# Patient Record
Sex: FEMALE | Race: WHITE | Employment: OTHER | RURAL
[De-identification: names, ages, dates, MRNs, and addresses within clinical notes are randomized per-mention and may not be internally consistent; named-entity substitution may affect disease eponyms.]

---

## 2023-05-30 RX ORDER — LEVOTHYROXINE SODIUM 0.07 MG/1
TABLET ORAL
Qty: 30 TABLET | Refills: 2 | Status: SHIPPED | OUTPATIENT
Start: 2023-05-30

## 2023-06-01 ENCOUNTER — HOSPITAL ENCOUNTER (OUTPATIENT)
Dept: MAMMOGRAPHY | Facility: HOSPITAL | Age: 63
Discharge: HOME | End: 2023-06-01
Payer: MEDICARE

## 2023-06-01 VITALS — WEIGHT: 198 LBS | BODY MASS INDEX: 32.99 KG/M2 | HEIGHT: 65 IN

## 2023-06-01 DIAGNOSIS — Z12.31 BREAST CANCER SCREENING BY MAMMOGRAM: ICD-10-CM

## 2023-06-01 PROCEDURE — 77063 BREAST TOMOSYNTHESIS BI: CPT

## 2023-06-06 ENCOUNTER — HOSPITAL ENCOUNTER (OUTPATIENT)
Facility: HOSPITAL | Age: 63
Discharge: HOME | End: 2023-06-06
Payer: MEDICARE

## 2023-06-06 DIAGNOSIS — E03.9 ACQUIRED HYPOTHYROIDISM: ICD-10-CM

## 2023-06-06 DIAGNOSIS — E03.9 ACQUIRED HYPOTHYROIDISM: Primary | ICD-10-CM

## 2023-06-06 LAB — TSH SERPL-MCNC: 3.91 UIU/ML (ref 0.27–4.2)

## 2023-06-06 PROCEDURE — 84443 ASSAY THYROID STIM HORMONE: CPT

## 2023-06-06 PROCEDURE — 36415 COLL VENOUS BLD VENIPUNCTURE: CPT

## 2023-08-28 RX ORDER — LEVOTHYROXINE SODIUM 0.07 MG/1
TABLET ORAL
Qty: 90 TABLET | Refills: 0 | Status: SHIPPED | OUTPATIENT
Start: 2023-08-28

## 2023-10-16 ENCOUNTER — TELEPHONE (OUTPATIENT)
Dept: CARDIOLOGY | Facility: CLINIC | Age: 63
End: 2023-10-16

## 2023-10-16 NOTE — TELEPHONE ENCOUNTER
Caller: Darcie NIETO    Relationship to patient: Self    Best call back number: [phone]    Chief complaint:     Type of visit: FOLLOW UP    Requested date: ASAP     If rescheduling, when is the original appointment: 10-17-23     Additional notes:PATIENT NEEDS FOLLOW UP APPOINTMENT. PLEASE REACH OUT TO PATIENT TO SCHEDULE.

## 2023-11-30 RX ORDER — LEVOTHYROXINE SODIUM 0.07 MG/1
TABLET ORAL
Qty: 90 TABLET | Refills: 0 | Status: SHIPPED | OUTPATIENT
Start: 2023-11-30

## 2023-11-30 NOTE — TELEPHONE ENCOUNTER
Patient called, requested refill.        Next Office Visit Date:  Future Appointments   Date Time Provider [address]   5/8/2024  1:30 PM NEYDA Hankins please review via PDMP

## 2024-01-10 ENCOUNTER — OFFICE VISIT (OUTPATIENT)
Dept: FAMILY MEDICINE CLINIC | Age: 64
End: 2024-01-10
Payer: MEDICARE

## 2024-01-10 ENCOUNTER — HOSPITAL ENCOUNTER (OUTPATIENT)
Facility: HOSPITAL | Age: 64
Discharge: HOME | End: 2024-01-10
Payer: MEDICARE

## 2024-01-10 VITALS
TEMPERATURE: 97.3 F | BODY MASS INDEX: 32.26 KG/M2 | SYSTOLIC BLOOD PRESSURE: 122 MMHG | DIASTOLIC BLOOD PRESSURE: 76 MMHG | HEIGHT: 65 IN | HEART RATE: 70 BPM | RESPIRATION RATE: 16 BRPM | OXYGEN SATURATION: 97 % | WEIGHT: 193.6 LBS

## 2024-01-10 DIAGNOSIS — M25.50 POLYARTHRALGIA: ICD-10-CM

## 2024-01-10 DIAGNOSIS — G47.33 OSA (OBSTRUCTIVE SLEEP APNEA): ICD-10-CM

## 2024-01-10 DIAGNOSIS — E03.9 ACQUIRED HYPOTHYROIDISM: Primary | ICD-10-CM

## 2024-01-10 DIAGNOSIS — L30.4 INTERTRIGO: ICD-10-CM

## 2024-01-10 DIAGNOSIS — E78.5 HYPERLIPIDEMIA, UNSPECIFIED HYPERLIPIDEMIA TYPE: ICD-10-CM

## 2024-01-10 DIAGNOSIS — E01.0 THYROMEGALY: ICD-10-CM

## 2024-01-10 DIAGNOSIS — E03.9 ACQUIRED HYPOTHYROIDISM: ICD-10-CM

## 2024-01-10 DIAGNOSIS — I10 ESSENTIAL HYPERTENSION: ICD-10-CM

## 2024-01-10 PROCEDURE — 1036F TOBACCO NON-USER: CPT | Performed by: NURSE PRACTITIONER

## 2024-01-10 PROCEDURE — 80053 COMPREHEN METABOLIC PANEL: CPT

## 2024-01-10 PROCEDURE — 85025 COMPLETE CBC W/AUTO DIFF WBC: CPT

## 2024-01-10 PROCEDURE — 84550 ASSAY OF BLOOD/URIC ACID: CPT

## 2024-01-10 PROCEDURE — 3074F SYST BP LT 130 MM HG: CPT | Performed by: NURSE PRACTITIONER

## 2024-01-10 PROCEDURE — 86038 ANTINUCLEAR ANTIBODIES: CPT

## 2024-01-10 PROCEDURE — 85652 RBC SED RATE AUTOMATED: CPT

## 2024-01-10 PROCEDURE — G8482 FLU IMMUNIZE ORDER/ADMIN: HCPCS | Performed by: NURSE PRACTITIONER

## 2024-01-10 PROCEDURE — 36415 COLL VENOUS BLD VENIPUNCTURE: CPT

## 2024-01-10 PROCEDURE — 3078F DIAST BP <80 MM HG: CPT | Performed by: NURSE PRACTITIONER

## 2024-01-10 PROCEDURE — 99214 OFFICE O/P EST MOD 30 MIN: CPT | Performed by: NURSE PRACTITIONER

## 2024-01-10 PROCEDURE — 3017F COLORECTAL CA SCREEN DOC REV: CPT | Performed by: NURSE PRACTITIONER

## 2024-01-10 PROCEDURE — 86140 C-REACTIVE PROTEIN: CPT

## 2024-01-10 PROCEDURE — 86431 RHEUMATOID FACTOR QUANT: CPT

## 2024-01-10 PROCEDURE — 84443 ASSAY THYROID STIM HORMONE: CPT

## 2024-01-10 PROCEDURE — G8417 CALC BMI ABV UP PARAM F/U: HCPCS | Performed by: NURSE PRACTITIONER

## 2024-01-10 PROCEDURE — G8427 DOCREV CUR MEDS BY ELIG CLIN: HCPCS | Performed by: NURSE PRACTITIONER

## 2024-01-10 RX ORDER — TRIAMCINOLONE ACETONIDE 1 MG/G
CREAM TOPICAL
Qty: 80 G | Refills: 2 | Status: SHIPPED | OUTPATIENT
Start: 2024-01-10

## 2024-01-10 SDOH — ECONOMIC STABILITY: FOOD INSECURITY: WITHIN THE PAST 12 MONTHS, YOU WORRIED THAT YOUR FOOD WOULD RUN OUT BEFORE YOU GOT MONEY TO BUY MORE.: NEVER TRUE

## 2024-01-10 SDOH — ECONOMIC STABILITY: HOUSING INSECURITY
IN THE LAST 12 MONTHS, WAS THERE A TIME WHEN YOU DID NOT HAVE A STEADY PLACE TO SLEEP OR SLEPT IN A SHELTER (INCLUDING NOW)?: NO

## 2024-01-10 SDOH — ECONOMIC STABILITY: INCOME INSECURITY: HOW HARD IS IT FOR YOU TO PAY FOR THE VERY BASICS LIKE FOOD, HOUSING, MEDICAL CARE, AND HEATING?: NOT HARD AT ALL

## 2024-01-10 SDOH — ECONOMIC STABILITY: FOOD INSECURITY: WITHIN THE PAST 12 MONTHS, THE FOOD YOU BOUGHT JUST DIDN'T LAST AND YOU DIDN'T HAVE MONEY TO GET MORE.: NEVER TRUE

## 2024-01-10 ASSESSMENT — PATIENT HEALTH QUESTIONNAIRE - PHQ9
SUM OF ALL RESPONSES TO PHQ QUESTIONS 1-9: 0
SUM OF ALL RESPONSES TO PHQ QUESTIONS 1-9: 0
2. FEELING DOWN, DEPRESSED OR HOPELESS: 0
SUM OF ALL RESPONSES TO PHQ QUESTIONS 1-9: 0
SUM OF ALL RESPONSES TO PHQ9 QUESTIONS 1 & 2: 0
1. LITTLE INTEREST OR PLEASURE IN DOING THINGS: 0
SUM OF ALL RESPONSES TO PHQ QUESTIONS 1-9: 0

## 2024-01-10 NOTE — PROGRESS NOTES
SUBJECTIVE:  Vikki Sanchez is a 63 y.o. female that presents with   Chief Complaint   Patient presents with    Thyroid Problem     Follow up    Joint Pain     Bilateral pinky finger pain   .    HPI:    This is a 63 year old white female who presents for follow-up with hypothyroidism hypertension hyperlipidemia GERD struct of sleep apnea obesity and polyarthralgia she specifically is having more pain and bilateral fifth digits.  They are swelling some and very tender especially first thing in the morning.  She tells me she is taking all of her medications tolerating everything well.  She has not had an ultrasound of her thyroid for many years so I am going to go on and schedule an ultrasound make sure there is not something going on.  She definitely appears to have a goiter.  Blood pressure has been good and she tells me she is trying to eat better.      Hypothyroidism    HPI:  Currently treated for Hypothyroidism?  Yes  Fatigue?  Yes  Recent change in weight?  yes  Cold/Heat intolerance?  No  Diarrhea/Constipation?  Yes constipation it is a lot better since colonoscopy  Diaphoresis?  No  Anxiety?  Yes  Palpitations?  No   Hair Loss?  No        Hypertension  This is a chronic problem. The current episode started more than 1 month ago. The problem is unchanged. The problem is controlled. Associated symptoms include malaise/fatigue and peripheral edema. Agents associated with hypertension include thyroid hormones. Risk factors for coronary artery disease include family history, post-menopausal state and sedentary lifestyle. Past treatments include beta blockers and diuretics. The current treatment provides significant improvement. There are no compliance problems.    Fatigue  This is a chronic problem. The current episode started more than 1 year ago. The problem occurs intermittently. The problem has been unchanged. Associated symptoms include fatigue. The symptoms are aggravated by stress. She has tried rest and

## 2024-01-10 NOTE — PATIENT INSTRUCTIONS
have the same symptoms. What may be good for you may be harmful to others.  If you are no longer taking a prescribed medication and you have pills left please take your pills out of their original containers. Mix crushed pills with an undesirable substance, such as cat litter or used coffee grounds. Put the mixture into a disposable container with a lid, such as an empty margarine tub, or into a sealable bag.  Cover up or remove any of your personal information on the empty containers by covering it with black permanent marker or duct tape.  Place the sealed container with the mixture, and the empty drug containers, in the trash.   If you use a medication that is in the form of a patch, dispose of used patches by folding them in half so that the sticky sides meet, and then flushing them down a toilet. They should not be placed in the household trash where children or pets can find them.  If you have any questions, ask your provider or pharmacist for more information.   Be sure to keep all appointments for provider visits or tests.

## 2024-01-10 NOTE — PROGRESS NOTES
Chief Complaint   Patient presents with    Thyroid Problem     Follow up    Joint Pain     Bilateral pinky finger pain       Have you seen any other physician or provider since your last visit yes - Dentist    Have you had any other diagnostic tests since your last visit? yes - dental cleaning and xrays    Have you changed or stopped any medications since your last visit? no

## 2024-01-11 LAB
ALBUMIN SERPL-MCNC: 4.1 G/DL (ref 3.4–4.8)
ALBUMIN/GLOB SERPL: 1.6 {RATIO} (ref 0.8–2)
ALP SERPL-CCNC: 88 U/L (ref 25–100)
ALT SERPL-CCNC: 27 U/L (ref 4–36)
ANION GAP SERPL CALCULATED.3IONS-SCNC: 10 MMOL/L (ref 3–16)
AST SERPL-CCNC: 27 U/L (ref 8–33)
BASOPHILS # BLD: 0 K/UL (ref 0–0.1)
BASOPHILS NFR BLD: 1.1 %
BILIRUB SERPL-MCNC: <0.2 MG/DL (ref 0.3–1.2)
BUN SERPL-MCNC: 10 MG/DL (ref 6–20)
CALCIUM SERPL-MCNC: 10.6 MG/DL (ref 8.5–10.5)
CHLORIDE SERPL-SCNC: 105 MMOL/L (ref 98–107)
CO2 SERPL-SCNC: 27 MMOL/L (ref 20–30)
CREAT SERPL-MCNC: 1 MG/DL (ref 0.4–1.2)
CRP SERPL-MCNC: <3 MG/L (ref 0–5.1)
EOSINOPHIL # BLD: 0.1 K/UL (ref 0–0.4)
EOSINOPHIL NFR BLD: 1.4 %
ERYTHROCYTE [DISTWIDTH] IN BLOOD BY AUTOMATED COUNT: 12.8 % (ref 11–16)
ERYTHROCYTE [SEDIMENTATION RATE] IN BLOOD BY WESTERGREN METHOD: 10 MM/HR (ref 0–30)
GFR SERPLBLD CREATININE-BSD FMLA CKD-EPI: >60 ML/MIN/{1.73_M2}
GLOBULIN SER CALC-MCNC: 2.6 G/DL
GLUCOSE SERPL-MCNC: 91 MG/DL (ref 74–106)
HCT VFR BLD AUTO: 40 % (ref 37–47)
HGB BLD-MCNC: 13.6 G/DL (ref 11.5–16.5)
IMM GRANULOCYTES # BLD: 0 K/UL
IMM GRANULOCYTES NFR BLD: 0.8 % (ref 0–5)
LYMPHOCYTES # BLD: 1.8 K/UL (ref 1.5–4)
LYMPHOCYTES NFR BLD: 49 %
MCH RBC QN AUTO: 31.1 PG (ref 27–32)
MCHC RBC AUTO-ENTMCNC: 34 G/DL (ref 31–35)
MCV RBC AUTO: 91.3 FL (ref 80–100)
MONOCYTES # BLD: 0.3 K/UL (ref 0.2–0.8)
MONOCYTES NFR BLD: 9.4 %
NEUTROPHILS # BLD: 1.4 K/UL (ref 2–7.5)
NEUTS SEG NFR BLD: 38.3 %
PLATELET # BLD AUTO: 156 K/UL (ref 150–400)
PMV BLD AUTO: 10.1 FL (ref 6–10)
POTASSIUM SERPL-SCNC: 4.3 MMOL/L (ref 3.4–5.1)
PROT SERPL-MCNC: 6.7 G/DL (ref 6.4–8.3)
RBC # BLD AUTO: 4.38 M/UL (ref 3.8–5.8)
RHEUMATOID FACT SER IA-ACNC: 10.1 IU/ML
SODIUM SERPL-SCNC: 142 MMOL/L (ref 136–145)
TSH SERPL-MCNC: 1.35 UIU/ML (ref 0.27–4.2)
URATE SERPL-MCNC: 6.2 MG/DL (ref 2.5–7.1)
WBC # BLD AUTO: 3.6 K/UL (ref 4–11)

## 2024-01-12 LAB — ANA SER QL IA: NEGATIVE

## 2024-01-17 ENCOUNTER — TELEPHONE (OUTPATIENT)
Dept: CARDIOLOGY | Facility: CLINIC | Age: 64
End: 2024-01-17

## 2024-01-23 ENCOUNTER — HOSPITAL ENCOUNTER (OUTPATIENT)
Dept: ULTRASOUND IMAGING | Facility: HOSPITAL | Age: 64
Discharge: HOME | End: 2024-01-23
Payer: MEDICARE

## 2024-01-23 DIAGNOSIS — E01.0 THYROMEGALY: ICD-10-CM

## 2024-01-23 PROCEDURE — 76536 US EXAM OF HEAD AND NECK: CPT

## 2024-02-13 ENCOUNTER — OFFICE VISIT (OUTPATIENT)
Dept: CARDIOLOGY | Facility: CLINIC | Age: 64
End: 2024-02-13
Payer: MEDICARE

## 2024-02-13 VITALS
SYSTOLIC BLOOD PRESSURE: 128 MMHG | DIASTOLIC BLOOD PRESSURE: 78 MMHG | HEART RATE: 78 BPM | HEIGHT: 65 IN | OXYGEN SATURATION: 99 % | BODY MASS INDEX: 32.99 KG/M2 | WEIGHT: 198 LBS

## 2024-02-13 DIAGNOSIS — G47.33 OSA (OBSTRUCTIVE SLEEP APNEA): Primary | Chronic | ICD-10-CM

## 2024-02-13 DIAGNOSIS — I25.118 CORONARY ARTERY DISEASE OF NATIVE ARTERY OF NATIVE HEART WITH STABLE ANGINA PECTORIS: Chronic | ICD-10-CM

## 2024-02-13 PROBLEM — I25.10 CORONARY ARTERY DISEASE INVOLVING NATIVE CORONARY ARTERY OF NATIVE HEART WITHOUT ANGINA PECTORIS: Status: RESOLVED | Noted: 2024-02-13 | Resolved: 2024-02-13

## 2024-02-13 PROBLEM — I25.10 CORONARY ARTERY DISEASE INVOLVING NATIVE CORONARY ARTERY OF NATIVE HEART WITHOUT ANGINA PECTORIS: Status: ACTIVE | Noted: 2024-02-13

## 2024-02-13 PROCEDURE — 99214 OFFICE O/P EST MOD 30 MIN: CPT | Performed by: NURSE PRACTITIONER

## 2024-02-13 PROCEDURE — 93000 ELECTROCARDIOGRAM COMPLETE: CPT | Performed by: NURSE PRACTITIONER

## 2024-02-13 PROCEDURE — 1160F RVW MEDS BY RX/DR IN RCRD: CPT | Performed by: NURSE PRACTITIONER

## 2024-02-13 PROCEDURE — 1159F MED LIST DOCD IN RCRD: CPT | Performed by: NURSE PRACTITIONER

## 2024-02-13 RX ORDER — ESOMEPRAZOLE MAGNESIUM 40 MG/1
40 CAPSULE, DELAYED RELEASE ORAL
COMMUNITY

## 2024-02-13 RX ORDER — SPIRONOLACTONE 50 MG/1
50 TABLET, FILM COATED ORAL DAILY
COMMUNITY

## 2024-02-13 RX ORDER — METOPROLOL SUCCINATE 50 MG/1
50 TABLET, EXTENDED RELEASE ORAL DAILY
COMMUNITY

## 2024-02-13 RX ORDER — VILAZODONE HYDROCHLORIDE 20 MG/1
20 TABLET ORAL DAILY
COMMUNITY

## 2024-02-13 RX ORDER — ROSUVASTATIN CALCIUM 10 MG/1
10 TABLET, COATED ORAL DAILY
COMMUNITY

## 2024-02-13 RX ORDER — LEVOTHYROXINE SODIUM 0.07 MG/1
1 TABLET ORAL DAILY
COMMUNITY
Start: 2023-11-30

## 2024-02-14 ENCOUNTER — TELEPHONE (OUTPATIENT)
Dept: CARDIOLOGY | Facility: CLINIC | Age: 64
End: 2024-02-14
Payer: MEDICARE

## 2024-02-26 DIAGNOSIS — I25.118 CORONARY ARTERY DISEASE OF NATIVE ARTERY OF NATIVE HEART WITH STABLE ANGINA PECTORIS: Chronic | ICD-10-CM

## 2024-03-08 DIAGNOSIS — M25.50 POLYARTHRALGIA: ICD-10-CM

## 2024-03-08 DIAGNOSIS — L30.4 INTERTRIGO: ICD-10-CM

## 2024-03-08 RX ORDER — ESOMEPRAZOLE MAGNESIUM 40 MG/1
40 CAPSULE, DELAYED RELEASE ORAL
Qty: 90 CAPSULE | Refills: 3 | Status: SHIPPED | OUTPATIENT
Start: 2024-03-08

## 2024-03-08 RX ORDER — TRIAMCINOLONE ACETONIDE 1 MG/G
CREAM TOPICAL
Qty: 80 G | Refills: 2 | Status: SHIPPED | OUTPATIENT
Start: 2024-03-08

## 2024-03-08 RX ORDER — VILAZODONE HYDROCHLORIDE 20 MG/1
20 TABLET ORAL DAILY
Qty: 90 TABLET | Refills: 3 | Status: SHIPPED | OUTPATIENT
Start: 2024-03-08

## 2024-03-08 RX ORDER — SPIRONOLACTONE 50 MG/1
TABLET, FILM COATED ORAL
Qty: 90 TABLET | Refills: 3 | Status: SHIPPED | OUTPATIENT
Start: 2024-03-08

## 2024-03-08 RX ORDER — METOPROLOL SUCCINATE 50 MG/1
TABLET, EXTENDED RELEASE ORAL
Qty: 90 TABLET | Refills: 3 | Status: SHIPPED | OUTPATIENT
Start: 2024-03-08

## 2024-03-08 RX ORDER — LEVOTHYROXINE SODIUM 0.07 MG/1
75 TABLET ORAL DAILY
Qty: 90 TABLET | Refills: 3 | Status: SHIPPED | OUTPATIENT
Start: 2024-03-08

## 2024-03-08 RX ORDER — ROSUVASTATIN CALCIUM 10 MG/1
TABLET, COATED ORAL
Qty: 90 TABLET | Refills: 3 | Status: SHIPPED | OUTPATIENT
Start: 2024-03-08

## 2024-03-13 ENCOUNTER — OFFICE VISIT (OUTPATIENT)
Dept: CARDIOLOGY | Facility: CLINIC | Age: 64
End: 2024-03-13
Payer: MEDICARE

## 2024-03-13 VITALS
HEART RATE: 71 BPM | SYSTOLIC BLOOD PRESSURE: 128 MMHG | BODY MASS INDEX: 32.82 KG/M2 | WEIGHT: 197 LBS | DIASTOLIC BLOOD PRESSURE: 74 MMHG | OXYGEN SATURATION: 97 % | HEIGHT: 65 IN

## 2024-03-13 DIAGNOSIS — I25.118 CORONARY ARTERY DISEASE OF NATIVE ARTERY OF NATIVE HEART WITH STABLE ANGINA PECTORIS: ICD-10-CM

## 2024-03-13 DIAGNOSIS — G47.33 OSA (OBSTRUCTIVE SLEEP APNEA): Primary | ICD-10-CM

## 2024-03-13 PROCEDURE — 99214 OFFICE O/P EST MOD 30 MIN: CPT | Performed by: NURSE PRACTITIONER

## 2024-03-13 NOTE — ASSESSMENT & PLAN NOTE
- Known history of mild CAD on heart cath in 2020.    -At last visit she had reported shortness of air and chest pain and had completed further evaluation with an echocardiogram and a nuclear stress test.    -Studies are reviewed in detail with patient and nuclear stress test is low risk study that was negative for ischemia.  Echocardiogram EF within normal limits 61 to 65%.  And no significant valvular regurgitation or stenosis.    -Studies are within normal limits    -Symptoms are improved and stable.    Continue current medical treatment:  Metoprolol  Rosuvastatin

## 2024-03-13 NOTE — PROGRESS NOTES
[facility] Provider Note     Date:   2024  Name: Darcie NIETO  :   1960  PCP: Carla Monaco APRN    Chief Complaint   Patient presents with    Follow-up     Sheila/Echo results       Subjective     History of Present Illness  Darcie NIETO is a 63 y.o. female who presents today for exertional chest pain and shortness of air.  She has completed further testing with nuclear stress test at [facility] and an echocardiogram here at today's office visit.    She like to review the studies at her office visit today.    At her office visit today she reports that she has not had any chest pain or shortness of breath since prior to her last visit here.  Overall she reports that she is feeling well.    CAD/JENNY history:    PSG 18 Baseline AHI 9 /Supine AHI 12    Titration 21 titrated to cpap 7 cm    Current settings: 7-14 and  cm    Sleep and cardiac history:     Nuclear stress: 2024 negative for ischemia, EF 74%, no left ventricular regional motion abnormality, no significant perfusion abnormalities-low risk stress test    ECHO 3/13/2024:·  Left ventricular systolic function is normal. Left ventricular ejection fraction appears to be 61 - 65%.  ·  Left ventricular wall thickness is consistent with borderline basal asymmetric hypertrophy.  ·  Left ventricular diastolic function is consistent with (grade I) impaired relaxation.  ·  No significant valvular regurgitation or stenosis present.     CAD: Heart cath  20 [place] Mild coronary artery artherosclerosis.  Mildly elevated left ventricular filling pressure without gradient across the aortic valve.     Reports Denies   Chest Pain [] [x]   Shortness of Air [] [x]   Palpitations [] [x]   Edema [] [x]   Dizziness [] [x]   Syncope [] [x]       Current mask used is nasal cushion    Device Functioning Well: Yes-noted DreamStation #2 and we discussed that this has a warning for overheating.   She has been unplugging when not in use and reports this is not convenient.  She wishes to replace her device.  Mask Fit Comfortable: Yes  Air Flow Comfortable: Yes  DME Helpful for Supplies: Yes  Sleep is rested:   Yes        Device Download:               Allergies   Allergen Reactions    Cephalexin Swelling and Shortness Of Breath    Penicillins Swelling and Shortness Of Breath    Codeine Swelling    Cyclobenzaprine Swelling    Diclofenac Sodium Swelling    Hydrocodone-Acetaminophen Swelling    Lisinopril Cough       Current Outpatient Medications:     esomeprazole (nexIUM) 40 MG capsule, Take 1 capsule by mouth Every Morning Before Breakfast., Disp: , Rfl:     estrogens, conjugated, (PREMARIN) 0.9 MG tablet, Take 1 tablet by mouth Daily. Take daily for 21 days then do not take for 7 days., Disp: , Rfl:     levothyroxine (SYNTHROID, LEVOTHROID) 75 MCG tablet, Take 1 tablet by mouth Daily., Disp: , Rfl:     metoprolol succinate XL (TOPROL-XL) 50 MG 24 hr tablet, Take 1 tablet by mouth Daily., Disp: , Rfl:     rosuvastatin (CRESTOR) 10 MG tablet, Take 1 tablet by mouth Daily., Disp: , Rfl:     spironolactone (ALDACTONE) 50 MG tablet, Take 1 tablet by mouth Daily., Disp: , Rfl:     vilazodone (VIIBRYD) 20 MG tablet tablet, Take 1 tablet by mouth Daily., Disp: , Rfl:     Past Medical History:   Diagnosis Date    CAD (coronary artery disease)     GERD (gastroesophageal reflux disease)     Hiatal hernia     Hypertension     JENNY (obstructive sleep apnea)     Thyroid disease       History reviewed. No pertinent surgical history.  History reviewed. No pertinent family history.  Social History     Socioeconomic History    Marital status:    Tobacco Use    Smoking status: Never     Passive exposure: Past    Smokeless tobacco: Never   Vaping Use    Vaping status: Never Used   Substance and Sexual Activity    Alcohol use: Not Currently    Drug use: Never    Sexual activity: Defer       Objective     Vital Signs:  BP  "128/74 (BP Location: Left arm, Patient Position: Sitting)   Pulse 71   Ht 165.1 cm (65\")   Wt 89.4 kg (197 lb)   SpO2 97%   BMI 32.78 kg/m²   Estimated body mass index is 32.78 kg/m² as calculated from the following:    Height as of this encounter: 165.1 cm (65\").    Weight as of this encounter: 89.4 kg (197 lb).               Physical Exam  HENT:      Head: Normocephalic.      Nose: Nose normal.      Mouth/Throat:      Mouth: Mucous membranes are moist.   Pulmonary:      Effort: Pulmonary effort is normal.   Skin:     General: Skin is warm and dry.   Neurological:      Mental Status: She is alert and oriented to person, place, and time.   Psychiatric:         Mood and Affect: Mood normal.         Behavior: Behavior normal.         Thought Content: Thought content normal.             Cardiology studies reviewed: Nuclear stress test 2/22/2024 at [facility] echocardiogram 3/13/2024 prelim results discussed with patient and PAP download reviewed: 2/11/2024 through 3/11/2024.  30-day download.  I have reviewed and interpreted the data on the download.          Assessment and Plan     Diagnoses and all orders for this visit:    1. JENNY (obstructive sleep apnea) (Primary)  Assessment & Plan:  Baseline AHI is 9.  This is mild sleep apnea.    Download reviewed with good control and good compliance.    Noted that her PAP device is greater than 5 years old and she wishes to have it replaced.    She is benefiting from PAP therapy with plan to continue PAP therapy.    Prescription to DME of patient's choice for auto CPAP 7 to 14 cm and CPAP supplies.    Follow-up for 31 to 90-day follow-up visit for compliance or about in 2 months on PAP therapy.    Orders:  -     PAP Therapy    2. Coronary artery disease of native artery of native heart with stable angina pectoris  Assessment & Plan:  - Known history of mild CAD on heart cath in 2020.    -At last visit she had reported shortness of air and chest pain and " had completed further evaluation with an echocardiogram and a nuclear stress test.    -Studies are reviewed in detail with patient and nuclear stress test is low risk study that was negative for ischemia.  Echocardiogram EF within normal limits 61 to 65%.  And no significant valvular regurgitation or stenosis.    -Studies are within normal limits    -Symptoms are improved and stable.    Continue current medical treatment:  Metoprolol  Rosuvastatin          Recommendations: Report if any new/changing symptoms immediately and advised of DreamStation #2 risk of overheating.  She is advised to follow all [place] safety precautions in addition she is to unplug her device when not in use.          Follow Up  Return in about 3 months (around 6/6/2024) for JENNY/ new cpap 31-90 days .  Patient was given instructions and counseling regarding her condition or for health maintenance advice. Please see specific information pulled into the AVS if appropriate.

## 2024-03-13 NOTE — ASSESSMENT & PLAN NOTE
Baseline AHI is 9.  This is mild sleep apnea.    Download reviewed with good control and good compliance.    Noted that her PAP device is greater than 5 years old and she wishes to have it replaced.    She is benefiting from PAP therapy with plan to continue PAP therapy.    Prescription to DME of patient's choice for auto CPAP 7 to 14 cm and CPAP supplies.    Follow-up for 31 to 90-day follow-up visit for compliance or about in 2 months on PAP therapy.

## 2024-04-03 ENCOUNTER — OFFICE VISIT (OUTPATIENT)
Dept: FAMILY MEDICINE CLINIC | Age: 64
End: 2024-04-03
Payer: MEDICARE

## 2024-04-03 VITALS
RESPIRATION RATE: 18 BRPM | HEART RATE: 63 BPM | DIASTOLIC BLOOD PRESSURE: 72 MMHG | BODY MASS INDEX: 32.78 KG/M2 | TEMPERATURE: 97.8 F | SYSTOLIC BLOOD PRESSURE: 128 MMHG | WEIGHT: 197 LBS | OXYGEN SATURATION: 97 %

## 2024-04-03 DIAGNOSIS — M54.42 ACUTE LEFT-SIDED LOW BACK PAIN WITH LEFT-SIDED SCIATICA: Primary | ICD-10-CM

## 2024-04-03 PROCEDURE — 99213 OFFICE O/P EST LOW 20 MIN: CPT

## 2024-04-03 PROCEDURE — 96372 THER/PROPH/DIAG INJ SC/IM: CPT

## 2024-04-03 PROCEDURE — 3078F DIAST BP <80 MM HG: CPT

## 2024-04-03 PROCEDURE — 3074F SYST BP LT 130 MM HG: CPT

## 2024-04-03 RX ORDER — KETOROLAC TROMETHAMINE 30 MG/ML
60 INJECTION, SOLUTION INTRAMUSCULAR; INTRAVENOUS ONCE
Status: COMPLETED | OUTPATIENT
Start: 2024-04-03 | End: 2024-04-03

## 2024-04-03 RX ADMIN — KETOROLAC TROMETHAMINE 60 MG: 30 INJECTION, SOLUTION INTRAMUSCULAR; INTRAVENOUS at 14:49

## 2024-04-03 NOTE — PROGRESS NOTES
Chief Complaint   Patient presents with    Back Pain     Left side started yesterday       Have you seen any other physician or provider since your last visit no    Have you had any other diagnostic tests since your last visit? no    Have you changed or stopped any medications since your last visit? no             
Patient tolerated injection well. Patient advised to wait 20 minutes in the office following the injection. No signs/symptoms of reaction noted after 20 minutes.  Administrations This Visit       ketorolac (TORADOL) injection 60 mg       Admin Date  04/03/2024  14:49 Action  Given Dose  60 mg Route  IntraMUSCular Site  Dorsogluteal Left Administered By  Matt Henry LPN    Ordering Provider: Diamante Rosas APRN - NP    ND: 68333-977-18    Lot#: [id number]    : [employer]    Patient Supplied?: No                    
(197 lb)   SpO2 97% Comment: ra  BMI 32.78 kg/m²   Physical Exam  Constitutional:       Appearance: Normal appearance.   HENT:      Head: Normocephalic and atraumatic.      Right Ear: External ear normal.      Left Ear: External ear normal.      Nose: Nose normal.      Mouth/Throat:      Mouth: Mucous membranes are moist.   Eyes:      Pupils: Pupils are equal, round, and reactive to light.   Cardiovascular:      Rate and Rhythm: Normal rate and regular rhythm.      Pulses: Normal pulses.      Heart sounds: Normal heart sounds.   Pulmonary:      Effort: Pulmonary effort is normal.      Breath sounds: Normal breath sounds.   Abdominal:      Palpations: Abdomen is soft.   Musculoskeletal:         General: Tenderness present.      Cervical back: Normal range of motion and neck supple.      Comments: Left lumbosacral area. Tenderness on palpation.   Skin:     General: Skin is warm and dry.      Capillary Refill: Capillary refill takes less than 2 seconds.   Neurological:      General: No focal deficit present.      Mental Status: She is alert and oriented to person, place, and time.   Psychiatric:         Mood and Affect: Mood normal.         Thought Content: Thought content normal.         Judgment: Judgment normal.       No results found for requested labs within last 30 days.       LDL Calculated (mg/dL)   Date Value   02/01/2022 70         Lab Results   Component Value Date/Time    WBC 3.6 01/10/2024 04:30 PM    NEUTROABS 1.4 01/10/2024 04:30 PM    HGB 13.6 01/10/2024 04:30 PM    HCT 40.0 01/10/2024 04:30 PM    MCV 91.3 01/10/2024 04:30 PM     01/10/2024 04:30 PM       Lab Results   Component Value Date    TSH 4.63 (H) 12/27/2022         ASSESSMENT/PLAN:  1. Acute left-sided low back pain with left-sided sciatica  -     ketorolac (TORADOL) injection 60 mg; 60 mg, IntraMUSCular, ONCE, 1 dose, On Wed 4/3/24 at 1430Do not administer for more than 5 days.       No orders of the defined types were placed in this

## 2024-04-08 ASSESSMENT — ENCOUNTER SYMPTOMS
EYES NEGATIVE: 1
GASTROINTESTINAL NEGATIVE: 1
RESPIRATORY NEGATIVE: 1
ALLERGIC/IMMUNOLOGIC NEGATIVE: 1

## 2024-04-17 ENCOUNTER — TELEPHONE (OUTPATIENT)
Dept: CARDIOLOGY | Facility: CLINIC | Age: 64
End: 2024-04-17
Payer: MEDICARE

## 2024-04-17 NOTE — TELEPHONE ENCOUNTER
Caller: Fred NIETO    Relationship: Self    Best call back number: [phone]     What is the best time to reach you: ANYTIME    Who are you requesting to speak with (clinical staff, provider,  specific staff member): CLINICAL    Do you know the name of the person who called: FRED    What was the call regarding: PT IS NEEDING DR. CORTES TO ORDER PT'S SLEEP MACHINE FROM [facility] IN [place]    Is it okay if the provider responds through [employer]: PLEASE CALL IF NEEDED

## 2024-04-17 NOTE — TELEPHONE ENCOUNTER
Called patient left detailed message DME company has gotten order and is being processed and they will call patient to book appt         HUB okay to relay message.

## 2024-04-26 ENCOUNTER — TELEPHONE (OUTPATIENT)
Dept: CARDIOLOGY | Facility: CLINIC | Age: 64
End: 2024-04-26

## 2024-04-26 NOTE — TELEPHONE ENCOUNTER
Hub staff attempted to follow warm transfer process and was unsuccessful     Caller: KENYATTA    Relationship to patient: Provider    Best call back number: [phone]     Patient is needing: PER CHARLES, PATIENT DOES NOT QUALIFY FOR A NEW CPAP MACHINE UNDER NEW MEDICARE GUIDELINES. PLEASE REACH OUT TO KENYATTA FOR FURTHER INFORMATION.

## 2024-04-29 ENCOUNTER — TELEPHONE (OUTPATIENT)
Dept: CARDIOLOGY | Facility: CLINIC | Age: 64
End: 2024-04-29
Payer: MEDICARE

## 2024-04-29 NOTE — TELEPHONE ENCOUNTER
PER [facility], PATIENT DOES NOT QUALIFY FOR A NEW CPAP MACHINE UNDER NEW MEDICARE GUIDELINES. PLEASE REACH OUT TO KENYATTA FOR FURTHER INFORMATION.

## 2024-05-14 RX ORDER — VILAZODONE HYDROCHLORIDE 20 MG/1
TABLET ORAL
Refills: 0 | OUTPATIENT
Start: 2024-05-14

## 2024-05-14 RX ORDER — METOPROLOL SUCCINATE 50 MG/1
TABLET, EXTENDED RELEASE ORAL
Refills: 0 | OUTPATIENT
Start: 2024-05-14

## 2024-05-14 RX ORDER — ESOMEPRAZOLE MAGNESIUM 40 MG/1
CAPSULE, DELAYED RELEASE ORAL
Refills: 0 | OUTPATIENT
Start: 2024-05-14

## 2024-05-14 RX ORDER — ROSUVASTATIN CALCIUM 10 MG/1
TABLET, COATED ORAL
Refills: 0 | OUTPATIENT
Start: 2024-05-14

## 2024-05-14 RX ORDER — SPIRONOLACTONE 50 MG/1
TABLET, FILM COATED ORAL
Refills: 0 | OUTPATIENT
Start: 2024-05-14

## 2024-05-14 RX ORDER — LEVOTHYROXINE SODIUM 0.07 MG/1
TABLET ORAL
Refills: 0 | OUTPATIENT
Start: 2024-05-14

## 2024-05-14 NOTE — TELEPHONE ENCOUNTER
Refill request received from pharmacy      Next Office Visit Date:  Future Appointments   Date Time Provider Department Center   5/14/2024  1:30 PM Rebecca Cardoso APRN [facility] - Please review via PDMP        Last Office Visit:    4/3/2024

## 2024-05-15 RX ORDER — ROSUVASTATIN CALCIUM 10 MG/1
TABLET, COATED ORAL
Qty: 90 TABLET | Refills: 3 | OUTPATIENT
Start: 2024-05-15

## 2024-05-15 RX ORDER — METOPROLOL SUCCINATE 50 MG/1
TABLET, EXTENDED RELEASE ORAL
Qty: 90 TABLET | Refills: 3 | OUTPATIENT
Start: 2024-05-15

## 2024-05-15 RX ORDER — ESOMEPRAZOLE MAGNESIUM 40 MG/1
40 CAPSULE, DELAYED RELEASE ORAL
Qty: 90 CAPSULE | Refills: 3 | OUTPATIENT
Start: 2024-05-15

## 2024-05-15 RX ORDER — SPIRONOLACTONE 50 MG/1
TABLET, FILM COATED ORAL
Qty: 90 TABLET | Refills: 3 | OUTPATIENT
Start: 2024-05-15

## 2024-05-15 RX ORDER — LEVOTHYROXINE SODIUM 0.07 MG/1
75 TABLET ORAL DAILY
Qty: 90 TABLET | Refills: 3 | OUTPATIENT
Start: 2024-05-15

## 2024-05-15 RX ORDER — VILAZODONE HYDROCHLORIDE 20 MG/1
20 TABLET ORAL DAILY
Qty: 90 TABLET | Refills: 3 | OUTPATIENT
Start: 2024-05-15

## 2024-05-15 NOTE — TELEPHONE ENCOUNTER
Patient called, requested refill.       Next Office Visit Date:  No future appointments.    KHADRA please review via PDMP

## 2024-05-31 ENCOUNTER — OFFICE VISIT (OUTPATIENT)
Dept: FAMILY MEDICINE CLINIC | Age: 64
End: 2024-05-31

## 2024-05-31 VITALS
WEIGHT: 196.6 LBS | DIASTOLIC BLOOD PRESSURE: 74 MMHG | OXYGEN SATURATION: 98 % | SYSTOLIC BLOOD PRESSURE: 124 MMHG | TEMPERATURE: 97.3 F | RESPIRATION RATE: 18 BRPM | BODY MASS INDEX: 32.76 KG/M2 | HEART RATE: 75 BPM | HEIGHT: 65 IN

## 2024-05-31 DIAGNOSIS — J01.90 ACUTE BACTERIAL SINUSITIS: Primary | ICD-10-CM

## 2024-05-31 DIAGNOSIS — B96.89 ACUTE BACTERIAL SINUSITIS: Primary | ICD-10-CM

## 2024-05-31 RX ORDER — LEVOFLOXACIN 500 MG/1
500 TABLET, FILM COATED ORAL DAILY
Qty: 10 TABLET | Refills: 0 | Status: SHIPPED | OUTPATIENT
Start: 2024-05-31 | End: 2024-06-10

## 2024-05-31 RX ORDER — PREDNISONE 10 MG/1
10 TABLET ORAL 2 TIMES DAILY
Qty: 10 TABLET | Refills: 0 | Status: SHIPPED | OUTPATIENT
Start: 2024-05-31 | End: 2024-06-05

## 2024-05-31 RX ORDER — METHYLPREDNISOLONE SODIUM SUCCINATE 125 MG/2ML
125 INJECTION, POWDER, LYOPHILIZED, FOR SOLUTION INTRAMUSCULAR; INTRAVENOUS ONCE
Status: SHIPPED | OUTPATIENT
Start: 2024-05-31

## 2024-05-31 RX ORDER — METHYLPREDNISOLONE SODIUM SUCCINATE 125 MG/2ML
125 INJECTION, POWDER, LYOPHILIZED, FOR SOLUTION INTRAMUSCULAR; INTRAVENOUS ONCE
Status: COMPLETED | OUTPATIENT
Start: 2024-05-31 | End: 2024-05-31

## 2024-05-31 RX ADMIN — METHYLPREDNISOLONE SODIUM SUCCINATE 125 MG: 125 INJECTION, POWDER, LYOPHILIZED, FOR SOLUTION INTRAMUSCULAR; INTRAVENOUS at 11:20

## 2024-05-31 NOTE — PROGRESS NOTES
Patient here today for sick visit only. Health Maintenance not reviewed during this visit.      Patient tolerated injection well. Patient advised to wait 20 minutes in the office following the injection. No signs/symptoms of reaction noted after 20 minutes.  Administrations This Visit       methylPREDNISolone sodium succ (SOLU-MEDROL) injection 125 mg       Admin Date  05/31/2024  11:20 Action  Given Dose  125 mg Route  IntraMUSCular Site  Dorsogluteal Left Administered By  Candice West, RN    Ordering Provider: Rebecca Cardoso APRN    NDC: 5528-3601-01    : [employer]    Patient Supplied?: No                   
organomegaly.  Extremities:  No clubbing, cyanosis or edema  Skin exam - normal coloration and turgor, no rashes, no suspicious skin lesions noted.  Psych -  Affect appropriate.  Thought process is normal without evidence of depression or psychosis.    Good insight and appropriae interaction.  Cognition and memory appear to be intact.        ASSESSMENT & PLAN  Vikki was seen today for head congestion, headache, nasal congestion and other.    Diagnoses and all orders for this visit:    Acute bacterial sinusitis  -     levoFLOXacin (LEVAQUIN) 500 MG tablet; Take 1 tablet by mouth daily for 10 days  -     predniSONE (DELTASONE) 10 MG tablet; Take 1 tablet by mouth 2 times daily for 5 days  -     methylPREDNISolone sodium succ (SOLU-MEDROL) injection 125 mg  -     methylPREDNISolone sodium succ (SOLU-MEDROL) injection 125 mg  -     methylPREDNISolone sodium succ (SOLU-MEDROL) injection 125 mg        No follow-ups on file.     -Patient advised to call immediately or go to ER if any worsening of symptoms  -Patient counseled on conservative care including fluids, rest and OTC meds    Vikki received counseling on the following healthy behaviors: medication adherence  Reviewed prior labs and health maintenance.  Continue current medications, diet and exercise.  Discussed use, benefit, and side effects of prescribed medications. Barriers to medication compliance addressed.   Patient given educational materials - see patient instructions.    All patient questions answered.  Patient voiced understanding.       I have reviewed this patient's history, habits, and medication list and have updated the chart where appropriate.

## 2024-06-10 ENCOUNTER — TELEPHONE (OUTPATIENT)
Dept: FAMILY MEDICINE CLINIC | Age: 64
End: 2024-06-10

## 2024-06-10 DIAGNOSIS — L30.4 INTERTRIGO: ICD-10-CM

## 2024-06-10 DIAGNOSIS — M25.50 POLYARTHRALGIA: ICD-10-CM

## 2024-06-10 RX ORDER — ERGOCALCIFEROL 1.25 MG/1
50000 CAPSULE ORAL WEEKLY
Qty: 12 CAPSULE | Refills: 3 | Status: SHIPPED | OUTPATIENT
Start: 2024-06-10

## 2024-06-10 RX ORDER — SPIRONOLACTONE 50 MG/1
TABLET, FILM COATED ORAL
Qty: 90 TABLET | Refills: 3 | Status: SHIPPED | OUTPATIENT
Start: 2024-06-10

## 2024-06-10 RX ORDER — METOPROLOL SUCCINATE 50 MG/1
TABLET, EXTENDED RELEASE ORAL
Qty: 90 TABLET | Refills: 3 | Status: SHIPPED | OUTPATIENT
Start: 2024-06-10

## 2024-06-10 RX ORDER — ESTRADIOL 1 MG/1
1 TABLET ORAL DAILY
Qty: 90 TABLET | Refills: 3 | Status: SHIPPED | OUTPATIENT
Start: 2024-06-10

## 2024-06-10 RX ORDER — TRIAMCINOLONE ACETONIDE 1 MG/G
CREAM TOPICAL
Qty: 80 G | Refills: 2 | Status: SHIPPED | OUTPATIENT
Start: 2024-06-10

## 2024-06-10 RX ORDER — ROSUVASTATIN CALCIUM 10 MG/1
TABLET, COATED ORAL
Qty: 90 TABLET | Refills: 3 | Status: SHIPPED | OUTPATIENT
Start: 2024-06-10

## 2024-06-10 RX ORDER — ESOMEPRAZOLE MAGNESIUM 40 MG/1
40 CAPSULE, DELAYED RELEASE ORAL
Qty: 90 CAPSULE | Refills: 3 | Status: SHIPPED | OUTPATIENT
Start: 2024-06-10

## 2024-06-10 RX ORDER — VILAZODONE HYDROCHLORIDE 20 MG/1
20 TABLET ORAL DAILY
Qty: 90 TABLET | Refills: 3 | Status: SHIPPED | OUTPATIENT
Start: 2024-06-10

## 2024-06-10 RX ORDER — LEVOCETIRIZINE DIHYDROCHLORIDE 5 MG/1
5 TABLET, FILM COATED ORAL NIGHTLY
Qty: 30 TABLET | Refills: 3 | Status: SHIPPED | OUTPATIENT
Start: 2024-06-10

## 2024-06-10 RX ORDER — LEVOTHYROXINE SODIUM 0.07 MG/1
75 TABLET ORAL DAILY
Qty: 90 TABLET | Refills: 3 | Status: SHIPPED | OUTPATIENT
Start: 2024-06-10

## 2024-06-10 NOTE — TELEPHONE ENCOUNTER
Patient called and stated that [facility] doesn't carry the Premarin. She was wondering if you could change it to something else? She said it would have to be a pill because the patches break her skin out.

## 2024-06-14 DIAGNOSIS — G47.33 OSA (OBSTRUCTIVE SLEEP APNEA): ICD-10-CM

## 2024-06-17 DIAGNOSIS — G47.33 OSA (OBSTRUCTIVE SLEEP APNEA): Primary | ICD-10-CM

## 2024-07-03 ENCOUNTER — TELEPHONE (OUTPATIENT)
Dept: CARDIOLOGY | Facility: CLINIC | Age: 64
End: 2024-07-03

## 2024-07-03 NOTE — TELEPHONE ENCOUNTER
This patient hasn't been set up with a new machine. I spoke with Les at [facility] and he states that they were needing the sleep study scored at 4% instead of the 3% that Dr Black had used on 11/30/18 study. They need a new sleep study scored using 4% rule. We've tried to contact patient twice about todays appointment and no answer.

## 2024-07-03 NOTE — TELEPHONE ENCOUNTER
ERROR. Patient called back and she states she had a sleep study scheduled. I didn't realize it till I looked in her chart that you had ordered one back in June. I apologize. Her sleep study is scheduled for 08/01/24.

## 2024-07-03 NOTE — TELEPHONE ENCOUNTER
Patient understands that she cannot get a new PAP machine until she has repeated a 6 sleep study that is positive for sleep apnea.  She is scheduled for a sleep study and she has a follow-up appointment.

## 2024-08-09 DIAGNOSIS — G47.33 OSA (OBSTRUCTIVE SLEEP APNEA): ICD-10-CM

## 2024-10-07 ENCOUNTER — OFFICE VISIT (OUTPATIENT)
Dept: FAMILY MEDICINE CLINIC | Age: 64
End: 2024-10-07
Payer: MEDICARE

## 2024-10-07 ENCOUNTER — HOSPITAL ENCOUNTER (OUTPATIENT)
Facility: HOSPITAL | Age: 64
Discharge: HOME | End: 2024-10-07

## 2024-10-07 VITALS
WEIGHT: 200.2 LBS | HEART RATE: 63 BPM | HEIGHT: 65 IN | OXYGEN SATURATION: 98 % | DIASTOLIC BLOOD PRESSURE: 70 MMHG | SYSTOLIC BLOOD PRESSURE: 118 MMHG | RESPIRATION RATE: 18 BRPM | BODY MASS INDEX: 33.36 KG/M2 | TEMPERATURE: 97.5 F

## 2024-10-07 DIAGNOSIS — B96.89 ACUTE BACTERIAL SINUSITIS: ICD-10-CM

## 2024-10-07 DIAGNOSIS — J01.90 ACUTE BACTERIAL SINUSITIS: ICD-10-CM

## 2024-10-07 DIAGNOSIS — R05.1 ACUTE COUGH: ICD-10-CM

## 2024-10-07 DIAGNOSIS — J02.9 SORE THROAT: Primary | ICD-10-CM

## 2024-10-07 LAB
INFLUENZA A ANTIBODY: NEGATIVE
INFLUENZA B ANTIBODY: NEGATIVE
Lab: NORMAL
QC PASS/FAIL: NORMAL
S PYO AG THROAT QL: NORMAL
SARS-COV-2 RDRP RESP QL NAA+PROBE: NEGATIVE

## 2024-10-07 PROCEDURE — 1036F TOBACCO NON-USER: CPT

## 2024-10-07 PROCEDURE — G8484 FLU IMMUNIZE NO ADMIN: HCPCS

## 2024-10-07 PROCEDURE — 3078F DIAST BP <80 MM HG: CPT

## 2024-10-07 PROCEDURE — G8427 DOCREV CUR MEDS BY ELIG CLIN: HCPCS

## 2024-10-07 PROCEDURE — 87880 STREP A ASSAY W/OPTIC: CPT

## 2024-10-07 PROCEDURE — 1090F PRES/ABSN URINE INCON ASSESS: CPT

## 2024-10-07 PROCEDURE — 3074F SYST BP LT 130 MM HG: CPT

## 2024-10-07 PROCEDURE — G8400 PT W/DXA NO RESULTS DOC: HCPCS

## 2024-10-07 PROCEDURE — 3017F COLORECTAL CA SCREEN DOC REV: CPT

## 2024-10-07 PROCEDURE — 1123F ACP DISCUSS/DSCN MKR DOCD: CPT

## 2024-10-07 PROCEDURE — 87804 INFLUENZA ASSAY W/OPTIC: CPT

## 2024-10-07 PROCEDURE — 99213 OFFICE O/P EST LOW 20 MIN: CPT

## 2024-10-07 PROCEDURE — G8417 CALC BMI ABV UP PARAM F/U: HCPCS

## 2024-10-07 PROCEDURE — 87635 SARS-COV-2 COVID-19 AMP PRB: CPT

## 2024-10-07 RX ORDER — DOXYCYCLINE HYCLATE 100 MG
100 TABLET ORAL 2 TIMES DAILY
Qty: 20 TABLET | Refills: 0 | Status: SHIPPED | OUTPATIENT
Start: 2024-10-07 | End: 2024-10-17

## 2024-10-07 RX ORDER — METHYLPREDNISOLONE 4 MG
TABLET, DOSE PACK ORAL
Qty: 1 KIT | Refills: 0 | Status: SHIPPED | OUTPATIENT
Start: 2024-10-07 | End: 2024-10-13

## 2024-10-07 RX ORDER — BROMPHENIRAMINE MALEATE, PSEUDOEPHEDRINE HYDROCHLORIDE, AND DEXTROMETHORPHAN HYDROBROMIDE 2; 30; 10 MG/5ML; MG/5ML; MG/5ML
5 SYRUP ORAL 4 TIMES DAILY PRN
Qty: 473 ML | Refills: 0 | Status: SHIPPED | OUTPATIENT
Start: 2024-10-07

## 2024-10-07 ASSESSMENT — ENCOUNTER SYMPTOMS
EYES NEGATIVE: 1
COUGH: 1
ALLERGIC/IMMUNOLOGIC NEGATIVE: 1
GASTROINTESTINAL NEGATIVE: 1
SINUS PRESSURE: 1

## 2024-10-07 NOTE — PROGRESS NOTES
Patient here today for sick visit only. Health Maintenance not reviewed during this visit.    
(XYZAL) 5 MG tablet Take 1 tablet by mouth nightly 30 tablet 3    estrogens, conjugated, (PREMARIN) 0.9 MG tablet Take 1 tablet by mouth daily 90 tablet 3    esomeprazole (NEXIUM) 40 MG delayed release capsule Take 1 capsule by mouth every morning (before breakfast) 90 capsule 3    diclofenac sodium (VOLTAREN) 1 % GEL Apply 4 g topically 4 times daily as needed for Pain 350 g 3    estradiol (ESTRACE) 1 MG tablet Take 1 tablet by mouth daily 90 tablet 3    Vaginal Lubricant ([employer] VAGINAL MOISTURIZER) GEL Place 1-2 mLs vaginally 2 times daily as needed (vaginal dryness) 1 g 0    hydrocortisone (ANUSOL-HC) 2.5 % rectal cream Place rectally 2 times daily. 90 g 3    SUPER B COMPLEX/C PO Take by mouth      EPINEPHrine (EPIPEN) 0.3 MG/0.3ML SOAJ injection USE AS DIRECTED FOR ALLERGIC REACTION 2 each 1    Lysine 500 MG CAPS Take  by mouth daily.         Facility-Administered Encounter Medications as of 10/7/2024   Medication Dose Route Frequency Provider Last Rate Last Admin    methylPREDNISolone sodium succ (SOLU-MEDROL) injection 125 mg  125 mg IntraVENous Once Rebecca Cardoso APRN        methylPREDNISolone sodium succ (SOLU-MEDROL) injection 125 mg  125 mg IntraVENous Once Rebecca Cardoso APRN           No follow-ups on file.      PATIENT COUNSELING    Counseling was provided today regardingthe following topics: Healthy eating habits, Regular exercise, substance abuse and healthy sleep habits.    Discussed use, benefit, and side effectsof prescribed medications.  Barriers to medication compliance addressed.  All patient questions answered.  Pt voiced understanding.     Electronically signed by NEYDA Condon NP on 10/7/24 at 2:54 PM EDT

## 2024-10-08 ENCOUNTER — OFFICE VISIT (OUTPATIENT)
Dept: CARDIOLOGY | Facility: CLINIC | Age: 64
End: 2024-10-08
Payer: MEDICARE

## 2024-10-08 VITALS
SYSTOLIC BLOOD PRESSURE: 132 MMHG | DIASTOLIC BLOOD PRESSURE: 86 MMHG | WEIGHT: 200 LBS | OXYGEN SATURATION: 97 % | HEART RATE: 57 BPM | HEIGHT: 65 IN | BODY MASS INDEX: 33.32 KG/M2

## 2024-10-08 DIAGNOSIS — G47.33 OSA (OBSTRUCTIVE SLEEP APNEA): ICD-10-CM

## 2024-10-08 DIAGNOSIS — I25.118 CORONARY ARTERY DISEASE OF NATIVE ARTERY OF NATIVE HEART WITH STABLE ANGINA PECTORIS: Primary | Chronic | ICD-10-CM

## 2024-10-08 PROCEDURE — 99213 OFFICE O/P EST LOW 20 MIN: CPT | Performed by: NURSE PRACTITIONER

## 2024-10-08 RX ORDER — ESTRADIOL 0.5 MG/1
0.5 TABLET ORAL DAILY
COMMUNITY

## 2024-10-08 RX ORDER — DOXYCYCLINE HYCLATE 100 MG
100 TABLET ORAL
COMMUNITY
Start: 2024-10-07 | End: 2024-10-17

## 2024-10-08 RX ORDER — METHYLPREDNISOLONE 4 MG
TABLET, DOSE PACK ORAL
COMMUNITY
Start: 2024-10-07 | End: 2024-10-13

## 2024-10-08 NOTE — ASSESSMENT & PLAN NOTE
Coronary artery disease is stable.  Continue current treatment regimen.  Cardiac status will be reassessed in 6 months.    Patient continues to have occasional exertional CP and shortness of breath.  nuclear stress test is low risk study that was negative for ischemia. Echocardiogram EF within normal limits 61 to 65%. And no significant valvular regurgitation or stenosis.     Patient to continue Rosuvastatin, Metoprolol, and spironolactone.

## 2024-10-08 NOTE — ASSESSMENT & PLAN NOTE
Patient has a baseline AHI 9.  This is mild sleep apnea.  Download shows good compliance and control.  Recent PSG was nondiagnostic for sleep apnea.  Patient states she has decided she does not want to try and buy a machine out of pocket.  She states she feels like she is going to put her pap device in the closet and save it for a time when she does need it again.

## 2024-10-08 NOTE — PROGRESS NOTES
[facility] Provider Note     Date:   10/08/2024  Name: Darcie NIETO  :   1960  PCP: Carla Monaco APRN    Chief Complaint   Patient presents with    Sleep Apnea       Subjective     History of Present Illness  Darcie NIETO is a 64 y.o. female who presents today for follow up JENNY.    We have reviewed and discussed the PSG.  Patient states she has been on a cpap for years and to find out she does not have sleep apnea.  She states she has had years of being woke up by air leaks and the mask coming off her face.  She states she thinks she is going home and put her pap device in the closet just in case one day she does actually need to be on cpap again.      Patient reports she continues to have occasional exertional CP and shortness of breath.  This continues to be stable.  She has lower extremity edema that is controlled with the spironolactone.  She denies any palpitations, dizziness, or syncope.          CAD/JENNY history:    PSG 18 Baseline AHI 9 /Supine AHI 12    Titration 21 titrated to cpap 7 cm    Current settings: 7-14 and  cm    Sleep and cardiac history:     Nuclear stress: 2024 negative for ischemia, EF 74%, no left ventricular regional motion abnormality, no significant perfusion abnormalities-low risk stress test    ECHO 3/13/2024:·  Left ventricular systolic function is normal. Left ventricular ejection fraction appears to be 61 - 65%.  ·  Left ventricular wall thickness is consistent with borderline basal asymmetric hypertrophy.  ·  Left ventricular diastolic function is consistent with (grade I) impaired relaxation.  ·  No significant valvular regurgitation or stenosis present.     CAD: Heart cath  20 [place] Mild coronary artery artherosclerosis.  Mildly elevated left ventricular filling pressure without gradient across the aortic valve.     Reports Denies   Chest Pain [] [x]   Shortness of Air [] [x]   Palpitations [] [x]   Edema [x]  []   Dizziness [] [x]   Syncope [] [x]       Current mask used is nasal cushion    Device Functioning Well: Yes  Mask Fit Comfortable: Yes  Air Flow Comfortable: Yes  DME Helpful for Supplies: Yes  Sleep is rested:   Yes        Device Download:               Allergies   Allergen Reactions    Cephalexin Swelling and Shortness Of Breath    Penicillins Swelling and Shortness Of Breath    Codeine Swelling    Cyclobenzaprine Swelling    Diclofenac Sodium Swelling    Hydrocodone-Acetaminophen Swelling    Lisinopril Cough       Current Outpatient Medications:     doxycycline (VIBRAMYICN) 100 MG tablet, Take 1 tablet by mouth., Disp: , Rfl:     esomeprazole (nexIUM) 40 MG capsule, Take 1 capsule by mouth Every Morning Before Breakfast., Disp: , Rfl:     estradiol (ESTRACE) 0.5 MG tablet, Take 1 tablet by mouth Daily., Disp: , Rfl:     levothyroxine (SYNTHROID, LEVOTHROID) 75 MCG tablet, Take 1 tablet by mouth Daily., Disp: , Rfl:     methylPREDNISolone (MEDROL) 4 MG dose pack, Take  by mouth., Disp: , Rfl:     metoprolol succinate XL (TOPROL-XL) 50 MG 24 hr tablet, Take 1 tablet by mouth Daily., Disp: , Rfl:     rosuvastatin (CRESTOR) 10 MG tablet, Take 1 tablet by mouth Daily., Disp: , Rfl:     spironolactone (ALDACTONE) 50 MG tablet, Take 1 tablet by mouth Daily., Disp: , Rfl:     vilazodone (VIIBRYD) 20 MG tablet tablet, Take 1 tablet by mouth Daily., Disp: , Rfl:     Past Medical History:   Diagnosis Date    CAD (coronary artery disease)     GERD (gastroesophageal reflux disease)     Hiatal hernia     Hypertension     JENNY (obstructive sleep apnea)     Thyroid disease       History reviewed. No pertinent surgical history.  History reviewed. No pertinent family history.  Social History     Socioeconomic History    Marital status:    Tobacco Use    Smoking status: Never     Passive exposure: Past    Smokeless tobacco: Never   Vaping Use    Vaping status: Never Used   Substance and Sexual Activity    Alcohol use: Not  "Currently    Drug use: Never    Sexual activity: Defer       Objective     Vital Signs:  /86   Pulse 57   Ht 165.1 cm (65\")   Wt 90.7 kg (200 lb)   SpO2 97%   BMI 33.28 kg/m²   Estimated body mass index is 33.28 kg/m² as calculated from the following:    Height as of this encounter: 165.1 cm (65\").    Weight as of this encounter: 90.7 kg (200 lb).               Physical Exam  Constitutional:       Appearance: Normal appearance.   Cardiovascular:      Rate and Rhythm: Normal rate and regular rhythm.      Pulses: Normal pulses.      Heart sounds: Normal heart sounds.   Pulmonary:      Effort: Pulmonary effort is normal.      Breath sounds: Normal breath sounds.   Skin:     General: Skin is warm and dry.      Capillary Refill: Capillary refill takes less than 2 seconds.   Neurological:      General: No focal deficit present.      Mental Status: She is alert and oriented to person, place, and time.   Psychiatric:         Mood and Affect: Mood normal.         Behavior: Behavior normal.         Thought Content: Thought content normal.         Judgment: Judgment normal.         The following data was reviewed by: SANDY Olvera on 10/08/2024:              Assessment and Plan     Diagnoses and all orders for this visit:    1. Coronary artery disease of native artery of native heart with stable angina pectoris (Primary)  Assessment & Plan:  Coronary artery disease is stable.  Continue current treatment regimen.  Cardiac status will be reassessed in 6 months.    Patient continues to have occasional exertional CP and shortness of breath.  nuclear stress test is low risk study that was negative for ischemia. Echocardiogram EF within normal limits 61 to 65%. And no significant valvular regurgitation or stenosis.     Patient to continue Rosuvastatin, Metoprolol, and spironolactone.      2. JENNY (obstructive sleep apnea)  Assessment & Plan:  Patient has a baseline AHI 9.  This is mild sleep apnea.  Download shows " good compliance and control.  Recent PSG was nondiagnostic for sleep apnea.  Patient states she has decided she does not want to try and buy a machine out of pocket.  She states she feels like she is going to put her pap device in the closet and save it for a time when she does need it again.            Recommendations: ER if symptoms increase, Report if any new/changing symptoms immediately, Limit salt, Sleep risks reviewed (driving, medical, sleep death, sedating agents), and Sleep hygiene discussed          Follow Up  Return in about 6 months (around 4/8/2025) for Next scheduled follow up.  Patient was given instructions and counseling regarding her condition or for health maintenance advice. Please see specific information pulled into the AVS if appropriate.

## 2024-10-21 ENCOUNTER — OFFICE VISIT (OUTPATIENT)
Dept: FAMILY MEDICINE CLINIC | Age: 64
End: 2024-10-21
Payer: MEDICARE

## 2024-10-21 ENCOUNTER — HOSPITAL ENCOUNTER (OUTPATIENT)
Facility: HOSPITAL | Age: 64
Discharge: HOME | End: 2024-10-21
Payer: MEDICARE

## 2024-10-21 VITALS
RESPIRATION RATE: 18 BRPM | HEART RATE: 60 BPM | SYSTOLIC BLOOD PRESSURE: 122 MMHG | WEIGHT: 202.1 LBS | HEIGHT: 65 IN | BODY MASS INDEX: 33.67 KG/M2 | DIASTOLIC BLOOD PRESSURE: 66 MMHG | OXYGEN SATURATION: 98 % | TEMPERATURE: 97.5 F

## 2024-10-21 DIAGNOSIS — I10 ESSENTIAL HYPERTENSION: ICD-10-CM

## 2024-10-21 DIAGNOSIS — E78.5 HYPERLIPIDEMIA, UNSPECIFIED HYPERLIPIDEMIA TYPE: ICD-10-CM

## 2024-10-21 DIAGNOSIS — R73.9 HYPERGLYCEMIA: ICD-10-CM

## 2024-10-21 DIAGNOSIS — K58.0 IRRITABLE BOWEL SYNDROME WITH DIARRHEA: ICD-10-CM

## 2024-10-21 DIAGNOSIS — K64.9 HEMORRHOIDS, UNSPECIFIED HEMORRHOID TYPE: ICD-10-CM

## 2024-10-21 DIAGNOSIS — E03.9 ACQUIRED HYPOTHYROIDISM: Primary | ICD-10-CM

## 2024-10-21 DIAGNOSIS — E03.9 ACQUIRED HYPOTHYROIDISM: ICD-10-CM

## 2024-10-21 PROCEDURE — 83036 HEMOGLOBIN GLYCOSYLATED A1C: CPT

## 2024-10-21 PROCEDURE — 1123F ACP DISCUSS/DSCN MKR DOCD: CPT | Performed by: NURSE PRACTITIONER

## 2024-10-21 PROCEDURE — 3017F COLORECTAL CA SCREEN DOC REV: CPT | Performed by: NURSE PRACTITIONER

## 2024-10-21 PROCEDURE — 84443 ASSAY THYROID STIM HORMONE: CPT

## 2024-10-21 PROCEDURE — 1090F PRES/ABSN URINE INCON ASSESS: CPT | Performed by: NURSE PRACTITIONER

## 2024-10-21 PROCEDURE — 80061 LIPID PANEL: CPT

## 2024-10-21 PROCEDURE — G8484 FLU IMMUNIZE NO ADMIN: HCPCS | Performed by: NURSE PRACTITIONER

## 2024-10-21 PROCEDURE — G8400 PT W/DXA NO RESULTS DOC: HCPCS | Performed by: NURSE PRACTITIONER

## 2024-10-21 PROCEDURE — 80053 COMPREHEN METABOLIC PANEL: CPT

## 2024-10-21 PROCEDURE — G8417 CALC BMI ABV UP PARAM F/U: HCPCS | Performed by: NURSE PRACTITIONER

## 2024-10-21 PROCEDURE — 99214 OFFICE O/P EST MOD 30 MIN: CPT | Performed by: NURSE PRACTITIONER

## 2024-10-21 PROCEDURE — 36415 COLL VENOUS BLD VENIPUNCTURE: CPT

## 2024-10-21 PROCEDURE — 3074F SYST BP LT 130 MM HG: CPT | Performed by: NURSE PRACTITIONER

## 2024-10-21 PROCEDURE — 1036F TOBACCO NON-USER: CPT | Performed by: NURSE PRACTITIONER

## 2024-10-21 PROCEDURE — G8427 DOCREV CUR MEDS BY ELIG CLIN: HCPCS | Performed by: NURSE PRACTITIONER

## 2024-10-21 PROCEDURE — 3078F DIAST BP <80 MM HG: CPT | Performed by: NURSE PRACTITIONER

## 2024-10-21 RX ORDER — DICYCLOMINE HYDROCHLORIDE 10 MG/1
10 CAPSULE ORAL
Qty: 120 CAPSULE | Refills: 0 | Status: SHIPPED | OUTPATIENT
Start: 2024-10-21

## 2024-10-22 LAB
ALBUMIN SERPL-MCNC: 4.3 G/DL (ref 3.4–4.8)
ALBUMIN/GLOB SERPL: 1.8 {RATIO} (ref 0.8–2)
ALP SERPL-CCNC: 99 U/L (ref 25–100)
ALT SERPL-CCNC: 28 U/L (ref 4–36)
ANION GAP SERPL CALCULATED.3IONS-SCNC: 10 MMOL/L (ref 3–16)
AST SERPL-CCNC: 29 U/L (ref 8–33)
BILIRUB SERPL-MCNC: 0.3 MG/DL (ref 0.3–1.2)
BUN SERPL-MCNC: 11 MG/DL (ref 6–20)
CALCIUM SERPL-MCNC: 11.1 MG/DL (ref 8.5–10.5)
CHLORIDE SERPL-SCNC: 105 MMOL/L (ref 98–107)
CHOLEST SERPL-MCNC: 124 MG/DL (ref 0–200)
CO2 SERPL-SCNC: 27 MMOL/L (ref 20–30)
CREAT SERPL-MCNC: 1 MG/DL (ref 0.4–1.2)
GFR SERPLBLD CREATININE-BSD FMLA CKD-EPI: 63 ML/MIN/{1.73_M2}
GLOBULIN SER CALC-MCNC: 2.4 G/DL
GLUCOSE SERPL-MCNC: 94 MG/DL (ref 74–106)
HBA1C MFR BLD: 6 %
HDLC SERPL-MCNC: 46 MG/DL (ref 40–60)
LDLC SERPL CALC-MCNC: 55 MG/DL
POTASSIUM SERPL-SCNC: 4.6 MMOL/L (ref 3.4–5.1)
PROT SERPL-MCNC: 6.7 G/DL (ref 6.4–8.3)
SODIUM SERPL-SCNC: 142 MMOL/L (ref 136–145)
TRIGL SERPL-MCNC: 114 MG/DL (ref 0–249)
TSH SERPL DL<=0.005 MIU/L-ACNC: 3.81 UIU/ML (ref 0.27–4.2)
VLDLC SERPL CALC-MCNC: 23 MG/DL

## 2024-11-02 ASSESSMENT — ENCOUNTER SYMPTOMS
GASTROINTESTINAL NEGATIVE: 1
EYES NEGATIVE: 1
RESPIRATORY NEGATIVE: 1

## 2024-11-02 NOTE — PROGRESS NOTES
\"Have you been to the ER, urgent care clinic since your last visit?  Hospitalized since your last visit?\"    NO    “Have you seen or consulted any other health care providers outside our system since your last visit?”    NO             
3 months (around 1/21/2025), or if symptoms worsen or fail to improve.      PATIENT COUNSELING    Counseling was provided today regardingthe following topics: Healthy eating habits, Regular exercise, substance abuse and healthy sleep habits.    Discussed use, benefit, and side effectsof prescribed medications.  Barriers to medication compliance addressed.  All patient questions answered.  Pt voiced understanding.

## 2024-11-11 DIAGNOSIS — K58.0 IRRITABLE BOWEL SYNDROME WITH DIARRHEA: ICD-10-CM

## 2024-11-12 RX ORDER — METOPROLOL SUCCINATE 50 MG/1
TABLET, EXTENDED RELEASE ORAL
Refills: 0 | OUTPATIENT
Start: 2024-11-12

## 2024-11-12 RX ORDER — ESTRADIOL 1 MG/1
TABLET ORAL
Refills: 0 | OUTPATIENT
Start: 2024-11-12

## 2024-11-12 RX ORDER — OMEPRAZOLE 40 MG/1
CAPSULE, DELAYED RELEASE ORAL
Refills: 0 | OUTPATIENT
Start: 2024-11-12

## 2024-11-12 RX ORDER — LEVOTHYROXINE SODIUM 75 UG/1
TABLET ORAL
Refills: 0 | OUTPATIENT
Start: 2024-11-12

## 2024-11-12 RX ORDER — ROSUVASTATIN CALCIUM 10 MG/1
TABLET, COATED ORAL
Refills: 0 | OUTPATIENT
Start: 2024-11-12

## 2024-11-12 RX ORDER — SPIRONOLACTONE 50 MG/1
TABLET, FILM COATED ORAL
Refills: 0 | OUTPATIENT
Start: 2024-11-12

## 2024-11-12 RX ORDER — DICYCLOMINE HYDROCHLORIDE 10 MG/1
10 CAPSULE ORAL
Qty: 120 CAPSULE | Refills: 2 | Status: SHIPPED | OUTPATIENT
Start: 2024-11-12 | End: 2025-02-10

## 2024-11-12 NOTE — TELEPHONE ENCOUNTER
Refill request received from pharmacy      Next Office Visit Date:  No future appointments.    KHADRA - Please review via PDMP        Last Office Visit:    10/21/2024

## 2024-11-25 ENCOUNTER — OFFICE VISIT (OUTPATIENT)
Age: 64
End: 2024-11-25

## 2024-11-25 VITALS
HEART RATE: 85 BPM | DIASTOLIC BLOOD PRESSURE: 84 MMHG | WEIGHT: 195.6 LBS | OXYGEN SATURATION: 97 % | HEIGHT: 65 IN | BODY MASS INDEX: 32.59 KG/M2 | TEMPERATURE: 97 F | SYSTOLIC BLOOD PRESSURE: 134 MMHG | RESPIRATION RATE: 18 BRPM

## 2024-11-25 DIAGNOSIS — J06.9 UPPER RESPIRATORY TRACT INFECTION, UNSPECIFIED TYPE: ICD-10-CM

## 2024-11-25 DIAGNOSIS — R35.0 URINARY FREQUENCY: Primary | ICD-10-CM

## 2024-11-25 DIAGNOSIS — R05.1 ACUTE COUGH: ICD-10-CM

## 2024-11-25 LAB
BILIRUBIN, POC: NEGATIVE
BLOOD URINE, POC: NORMAL
CLARITY, POC: NORMAL
COLOR, POC: YELLOW
GLUCOSE URINE, POC: NEGATIVE MG/DL
KETONES, POC: NEGATIVE MG/DL
LEUKOCYTE EST, POC: NORMAL
NITRITE, POC: NEGATIVE
PH, POC: 6
PROTEIN, POC: NEGATIVE MG/DL
SPECIFIC GRAVITY, POC: 1.01
UROBILINOGEN, POC: 0.2 MG/DL

## 2024-11-25 RX ORDER — METHYLPREDNISOLONE 4 MG/1
TABLET ORAL
Qty: 1 KIT | Refills: 0 | Status: SHIPPED | OUTPATIENT
Start: 2024-11-25 | End: 2024-12-01

## 2024-11-25 RX ORDER — AZITHROMYCIN 250 MG/1
TABLET, FILM COATED ORAL
Qty: 6 TABLET | Refills: 0 | Status: SHIPPED | OUTPATIENT
Start: 2024-11-25 | End: 2024-12-05

## 2024-11-25 ASSESSMENT — ENCOUNTER SYMPTOMS
RHINORRHEA: 0
GASTROINTESTINAL NEGATIVE: 1
COUGH: 1
WHEEZING: 0
SHORTNESS OF BREATH: 0
ALLERGIC/IMMUNOLOGIC NEGATIVE: 1
EYES NEGATIVE: 1

## 2024-11-25 NOTE — PROGRESS NOTES
[facility] [address] [address]  Dept: [phone]  Loc: [phone]     SUBJECTIVE:  Vikki Sanchez is a 64 y.o. female that presents with   Chief Complaint   Patient presents with    Urinary Pain     Burning     Cough     X2 dry cough after covid and flu shot   .    HPI:    Tracie is a pleasant 64-year-old female who presents to clinic with dysuria.  She notes symptoms started this morning.  She denies any fever, or urgency.  POCT is negative for urinary tract infection.  She says she has started a keto diet within the past 2 weeks.  As it is typical to get what is called a keto flu when starting a keto diet, I did inform her this may be possible.  You can also have increased burning on urination as the urine does turn more acidic.  I have encouraged her to increase her water intake.  She is also having a cough that has been present for the past 2 weeks.  She said it started around the same time that she got her flu and COVID-vaccine, she did get them in the same day.  She denies any fever, shortness of breath, or other systemic symptoms.  She has been taking a cough syrup at night, but says it has not been helping her.    Urinary Pain   This is a new problem. The current episode started today. The problem has been unchanged. The quality of the pain is described as burning. The patient is experiencing no pain. There has been no fever. She is Sexually active. There is No history of pyelonephritis. Pertinent negatives include no flank pain, frequency, hematuria or urgency. She has tried nothing for the symptoms.   Cough  This is a new problem. The current episode started 1 to 4 weeks ago. The problem has been unchanged. The cough is Non-productive. Pertinent negatives include no fever, nasal congestion, postnasal drip, rhinorrhea, shortness of breath or wheezing. Nothing aggravates the symptoms. She has tried prescription cough suppressant

## 2024-11-25 NOTE — PATIENT INSTRUCTIONS
We are committed to providing you with the best care possible.   In order to help us achieve these goals please remember to bring all medications, herbal products, and over the counter supplements with you to each visit.     If your provider has ordered testing for you, please be sure to follow up with our office if you have not received results within 7 days after the testing took place.     *If you receive a survey after visiting one of our offices, please take time to share your experience concerning your physician office visit. These surveys are confidential and no health information about you is shared.  We are eager to improve for you and we are counting on your feedback to help make that happen.     Keep a list of your medicines with you. List all of the prescription medicines, nonprescription medicines, supplements, natural remedies, and vitamins that you take. Tell your healthcare providers who treat you about all of the products you are taking. Your provider can provide you with a form to keep track of them. Just ask.  Follow the directions that come with your medicine, including information about food or alcohol. Make sure you know how and when to take your medicine. Do not take more or less than you are supposed to take.  Keep all medicines out of the reach of children.  Store medicines according to the directions on the label.  Monitor yourself. Learn to know how your body reacts to your new medicine and keep track of how it makes you feel before attempting (If your provider has allowed you to do so) to drive or go to work.   Seek emergency medical attention if you think you have used too much of this medicine. An overdose of any prescription medicine can be fatal. Overdose symptoms may include extreme drowsiness, muscle weakness, confusion, cold and clammy skin, pinpoint pupils, shallow breathing, slow heart rate, fainting, or coma.  Don't share prescription medicines with others, even when they seem

## 2024-12-20 DIAGNOSIS — K21.9 GASTROESOPHAGEAL REFLUX DISEASE WITHOUT ESOPHAGITIS: ICD-10-CM

## 2024-12-20 DIAGNOSIS — K59.09 CHRONIC CONSTIPATION: Primary | ICD-10-CM

## 2024-12-26 DIAGNOSIS — M25.50 POLYARTHRALGIA: ICD-10-CM

## 2024-12-26 DIAGNOSIS — F41.9 ANXIETY: Primary | ICD-10-CM

## 2024-12-26 DIAGNOSIS — L30.4 INTERTRIGO: ICD-10-CM

## 2024-12-26 DIAGNOSIS — K58.0 IRRITABLE BOWEL SYNDROME WITH DIARRHEA: ICD-10-CM

## 2024-12-27 RX ORDER — METOPROLOL SUCCINATE 50 MG/1
TABLET, EXTENDED RELEASE ORAL
Qty: 90 TABLET | Refills: 3 | Status: SHIPPED | OUTPATIENT
Start: 2024-12-27

## 2024-12-27 RX ORDER — ERGOCALCIFEROL 1.25 MG/1
50000 CAPSULE, LIQUID FILLED ORAL WEEKLY
Qty: 12 CAPSULE | Refills: 3 | Status: SHIPPED | OUTPATIENT
Start: 2024-12-27

## 2024-12-27 RX ORDER — SPIRONOLACTONE 50 MG/1
TABLET, FILM COATED ORAL
Qty: 90 TABLET | Refills: 3 | Status: SHIPPED | OUTPATIENT
Start: 2024-12-27

## 2024-12-27 RX ORDER — ESTRADIOL 1 MG/1
1 TABLET ORAL DAILY
Qty: 90 TABLET | Refills: 3 | Status: SHIPPED | OUTPATIENT
Start: 2024-12-27

## 2024-12-27 RX ORDER — VILAZODONE HYDROCHLORIDE 20 MG/1
20 TABLET ORAL DAILY
Qty: 90 TABLET | Refills: 3 | Status: SHIPPED | OUTPATIENT
Start: 2024-12-27

## 2024-12-27 RX ORDER — LEVOCETIRIZINE DIHYDROCHLORIDE 5 MG/1
5 TABLET, FILM COATED ORAL NIGHTLY
Qty: 30 TABLET | Refills: 3 | Status: SHIPPED | OUTPATIENT
Start: 2024-12-27

## 2024-12-27 RX ORDER — ESOMEPRAZOLE MAGNESIUM 40 MG/1
40 CAPSULE, DELAYED RELEASE ORAL
Qty: 90 CAPSULE | Refills: 3 | Status: SHIPPED | OUTPATIENT
Start: 2024-12-27

## 2024-12-27 RX ORDER — LEVOTHYROXINE SODIUM 75 UG/1
75 TABLET ORAL DAILY
Qty: 90 TABLET | Refills: 3 | Status: SHIPPED | OUTPATIENT
Start: 2024-12-27

## 2024-12-27 RX ORDER — TRIAMCINOLONE ACETONIDE 1 MG/G
CREAM TOPICAL
Qty: 80 G | Refills: 2 | Status: SHIPPED | OUTPATIENT
Start: 2024-12-27

## 2024-12-27 RX ORDER — DICYCLOMINE HYDROCHLORIDE 10 MG/1
CAPSULE ORAL
Qty: 360 CAPSULE | Refills: 3 | OUTPATIENT
Start: 2024-12-27

## 2024-12-27 RX ORDER — ROSUVASTATIN CALCIUM 10 MG/1
TABLET, COATED ORAL
Qty: 90 TABLET | Refills: 3 | Status: SHIPPED | OUTPATIENT
Start: 2024-12-27

## 2024-12-30 ENCOUNTER — TELEPHONE (OUTPATIENT)
Dept: FAMILY MEDICINE CLINIC | Age: 64
End: 2024-12-30

## 2024-12-30 RX ORDER — OMEPRAZOLE 40 MG/1
CAPSULE, DELAYED RELEASE ORAL
Refills: 0 | OUTPATIENT
Start: 2024-12-30

## 2025-01-08 ENCOUNTER — TELEPHONE (OUTPATIENT)
Age: 65
End: 2025-01-08

## 2025-01-13 ENCOUNTER — TELEPHONE (OUTPATIENT)
Age: 65
End: 2025-01-13

## 2025-01-13 NOTE — TELEPHONE ENCOUNTER
Pt has requested PA for vilazodone and I had submitted with insurance on file but received response that pt is not covered by plan. I can not resend without correct rx benefit coverage. Attempted to call pt to get info but no answer.

## 2025-01-16 RX ORDER — DULOXETIN HYDROCHLORIDE 30 MG/1
30 CAPSULE, DELAYED RELEASE ORAL DAILY
Qty: 90 CAPSULE | Refills: 3 | Status: SHIPPED | OUTPATIENT
Start: 2025-01-16

## 2025-02-25 DIAGNOSIS — K58.0 IRRITABLE BOWEL SYNDROME WITH DIARRHEA: ICD-10-CM

## 2025-02-25 RX ORDER — DICYCLOMINE HYDROCHLORIDE 10 MG/1
CAPSULE ORAL
Qty: 360 CAPSULE | Refills: 3 | Status: SHIPPED | OUTPATIENT
Start: 2025-02-25

## 2025-02-25 RX ORDER — LEVOCETIRIZINE DIHYDROCHLORIDE 5 MG/1
5 TABLET, FILM COATED ORAL NIGHTLY
Qty: 90 TABLET | Refills: 3 | OUTPATIENT
Start: 2025-02-25

## 2025-02-25 NOTE — TELEPHONE ENCOUNTER
Refill request received from pharmacy      Next Office Visit Date:  No future appointments.    KHADRA - Please review via PDMP        Last Office Visit:    11/25/2024

## 2025-03-05 ENCOUNTER — OFFICE VISIT (OUTPATIENT)
Age: 65
End: 2025-03-05
Payer: MEDICARE

## 2025-03-05 ENCOUNTER — TELEPHONE (OUTPATIENT)
Age: 65
End: 2025-03-05

## 2025-03-05 VITALS
TEMPERATURE: 97.3 F | BODY MASS INDEX: 31.49 KG/M2 | WEIGHT: 189 LBS | HEART RATE: 61 BPM | HEIGHT: 65 IN | RESPIRATION RATE: 18 BRPM | DIASTOLIC BLOOD PRESSURE: 70 MMHG | OXYGEN SATURATION: 98 % | SYSTOLIC BLOOD PRESSURE: 118 MMHG

## 2025-03-05 DIAGNOSIS — N39.3 STRESS INCONTINENCE: Primary | ICD-10-CM

## 2025-03-05 PROCEDURE — G8417 CALC BMI ABV UP PARAM F/U: HCPCS

## 2025-03-05 PROCEDURE — 3017F COLORECTAL CA SCREEN DOC REV: CPT

## 2025-03-05 PROCEDURE — 1036F TOBACCO NON-USER: CPT

## 2025-03-05 PROCEDURE — 3074F SYST BP LT 130 MM HG: CPT

## 2025-03-05 PROCEDURE — 3078F DIAST BP <80 MM HG: CPT

## 2025-03-05 PROCEDURE — G8427 DOCREV CUR MEDS BY ELIG CLIN: HCPCS

## 2025-03-05 PROCEDURE — 1123F ACP DISCUSS/DSCN MKR DOCD: CPT

## 2025-03-05 PROCEDURE — G8400 PT W/DXA NO RESULTS DOC: HCPCS

## 2025-03-05 PROCEDURE — 1090F PRES/ABSN URINE INCON ASSESS: CPT

## 2025-03-05 PROCEDURE — 99213 OFFICE O/P EST LOW 20 MIN: CPT

## 2025-03-05 RX ORDER — PANTOPRAZOLE SODIUM 40 MG/1
TABLET, DELAYED RELEASE ORAL
COMMUNITY
Start: 2025-03-03

## 2025-03-05 RX ORDER — OXYBUTYNIN CHLORIDE 10 MG/1
10 TABLET, EXTENDED RELEASE ORAL DAILY
Qty: 30 TABLET | Refills: 3 | Status: SHIPPED | OUTPATIENT
Start: 2025-03-05

## 2025-03-05 SDOH — ECONOMIC STABILITY: FOOD INSECURITY: WITHIN THE PAST 12 MONTHS, THE FOOD YOU BOUGHT JUST DIDN'T LAST AND YOU DIDN'T HAVE MONEY TO GET MORE.: NEVER TRUE

## 2025-03-05 SDOH — ECONOMIC STABILITY: FOOD INSECURITY: WITHIN THE PAST 12 MONTHS, YOU WORRIED THAT YOUR FOOD WOULD RUN OUT BEFORE YOU GOT MONEY TO BUY MORE.: NEVER TRUE

## 2025-03-05 ASSESSMENT — PATIENT HEALTH QUESTIONNAIRE - PHQ9
SUM OF ALL RESPONSES TO PHQ QUESTIONS 1-9: 0
1. LITTLE INTEREST OR PLEASURE IN DOING THINGS: NOT AT ALL
SUM OF ALL RESPONSES TO PHQ QUESTIONS 1-9: 0
2. FEELING DOWN, DEPRESSED OR HOPELESS: NOT AT ALL

## 2025-03-05 NOTE — PROGRESS NOTES
Chief Complaint   Patient presents with    Other     Patient states she has bladder implant but was told that she has stress incontinence and that [place] [facility] stated the bladder implant would not work for that and that she needed medication    Patient also states her ears itch       Have you seen any other physician or provider since your last visit yes - GI Dr. Oh in [place]    Have you had any other diagnostic tests since your last visit? no    Have you changed or stopped any medications since your last visit? yes - started on protonix

## 2025-03-06 ASSESSMENT — ENCOUNTER SYMPTOMS
EYES NEGATIVE: 1
ALLERGIC/IMMUNOLOGIC NEGATIVE: 1
GASTROINTESTINAL NEGATIVE: 1
RESPIRATORY NEGATIVE: 1

## 2025-03-06 NOTE — PROGRESS NOTES
[facility] [address] [address]  Dept: [phone]  Loc: [phone]     SUBJECTIVE:  Vikki Sanchez is a 64 y.o. female that presents with   Chief Complaint   Patient presents with    Other     Patient states she has bladder implant but was told that she has stress incontinence and that [place] [facility] stated the bladder implant would not work for that and that she needed medication. She stated they wanted her to come back to [place] to get medication but she wanted to see if she could get something prescribed here.     Patient also states her ears are itching inside   .    HPI:    Tracie is a pleasant 64-year-old female who presents to clinic with complaints of stress incontinence.  Christelle does have a bladder stimulator and she has had this for many years.  She tells me it has recently been changed out and upgraded, but she continues to have some stress incontinence.  Per her urologist the bladder stimulator will not help with the stress incontinence that it is unfortunately part of getting older.  She is agreeable to trialing oxybutynin.        Review of Systems   Constitutional: Negative.    HENT: Negative.     Eyes: Negative.    Respiratory: Negative.     Cardiovascular: Negative.    Gastrointestinal: Negative.    Endocrine: Negative.    Genitourinary:         Stress incontinence  Bladder stimulator   Musculoskeletal: Negative.    Skin: Negative.    Allergic/Immunologic: Negative.    Neurological: Negative.    Hematological: Negative.    Psychiatric/Behavioral: Negative.          OBJECTIVE:  /70   Pulse 61   Temp 97.3 °F (36.3 °C) (Infrared)   Resp 18   Ht 1.651 m (5' 5\")   Wt 85.7 kg (189 lb)   SpO2 98% Comment: ra  BMI 31.45 kg/m²   Physical Exam  Constitutional:       Appearance: Normal appearance.   HENT:      Head: Normocephalic and atraumatic.      Right Ear: External ear normal.      Left Ear: External ear normal.

## 2025-03-10 ENCOUNTER — OFFICE VISIT (OUTPATIENT)
Age: 65
End: 2025-03-10
Payer: MEDICARE

## 2025-03-10 VITALS
SYSTOLIC BLOOD PRESSURE: 132 MMHG | WEIGHT: 187.6 LBS | TEMPERATURE: 97.2 F | HEIGHT: 65 IN | RESPIRATION RATE: 18 BRPM | DIASTOLIC BLOOD PRESSURE: 78 MMHG | OXYGEN SATURATION: 97 % | BODY MASS INDEX: 31.25 KG/M2 | HEART RATE: 72 BPM

## 2025-03-10 DIAGNOSIS — J02.9 SORE THROAT: Primary | ICD-10-CM

## 2025-03-10 DIAGNOSIS — J01.90 ACUTE BACTERIAL SINUSITIS: ICD-10-CM

## 2025-03-10 DIAGNOSIS — R09.81 CONGESTION OF NASAL SINUS: ICD-10-CM

## 2025-03-10 DIAGNOSIS — R05.1 ACUTE COUGH: ICD-10-CM

## 2025-03-10 DIAGNOSIS — B96.89 ACUTE BACTERIAL SINUSITIS: ICD-10-CM

## 2025-03-10 LAB
INFLUENZA A ANTIGEN, POC: NEGATIVE
INFLUENZA B ANTIGEN, POC: NEGATIVE
LOT EXPIRE DATE: NORMAL
LOT KIT NUMBER: NORMAL
S PYO AG THROAT QL: NORMAL
SARS-COV-2 RNA, POC: NEGATIVE
VALID INTERNAL CONTROL: NORMAL
VENDOR AND KIT NAME POC: NORMAL

## 2025-03-10 PROCEDURE — 3017F COLORECTAL CA SCREEN DOC REV: CPT

## 2025-03-10 PROCEDURE — 99213 OFFICE O/P EST LOW 20 MIN: CPT

## 2025-03-10 PROCEDURE — 1036F TOBACCO NON-USER: CPT

## 2025-03-10 PROCEDURE — 1123F ACP DISCUSS/DSCN MKR DOCD: CPT

## 2025-03-10 PROCEDURE — 87428 SARSCOV & INF VIR A&B AG IA: CPT

## 2025-03-10 PROCEDURE — G8427 DOCREV CUR MEDS BY ELIG CLIN: HCPCS

## 2025-03-10 PROCEDURE — 1090F PRES/ABSN URINE INCON ASSESS: CPT

## 2025-03-10 PROCEDURE — 3078F DIAST BP <80 MM HG: CPT

## 2025-03-10 PROCEDURE — 3075F SYST BP GE 130 - 139MM HG: CPT

## 2025-03-10 PROCEDURE — G8400 PT W/DXA NO RESULTS DOC: HCPCS

## 2025-03-10 PROCEDURE — 87880 STREP A ASSAY W/OPTIC: CPT

## 2025-03-10 PROCEDURE — G8417 CALC BMI ABV UP PARAM F/U: HCPCS

## 2025-03-10 RX ORDER — METHYLPREDNISOLONE 4 MG/1
TABLET ORAL
Qty: 1 KIT | Refills: 0 | Status: SHIPPED | OUTPATIENT
Start: 2025-03-10 | End: 2025-03-16

## 2025-03-10 RX ORDER — CEFDINIR 300 MG/1
300 CAPSULE ORAL 2 TIMES DAILY
Qty: 20 CAPSULE | Refills: 0 | Status: SHIPPED | OUTPATIENT
Start: 2025-03-10 | End: 2025-03-20

## 2025-03-10 RX ORDER — BROMPHENIRAMINE MALEATE, PSEUDOEPHEDRINE HYDROCHLORIDE, AND DEXTROMETHORPHAN HYDROBROMIDE 2; 30; 10 MG/5ML; MG/5ML; MG/5ML
5 SYRUP ORAL 4 TIMES DAILY PRN
Qty: 473 ML | Refills: 0 | Status: SHIPPED | OUTPATIENT
Start: 2025-03-10

## 2025-03-10 ASSESSMENT — ENCOUNTER SYMPTOMS
SINUS PRESSURE: 1
SORE THROAT: 1
COUGH: 1
SINUS PAIN: 1

## 2025-03-10 NOTE — PROGRESS NOTES
Vikki Sanchez (:  1960) is a 64 y.o. female,Established patient, here for evaluation of the following chief complaint(s):  Cough (X3 days ), Headache, Pharyngitis, Nasal Congestion, and Ear Pain         Assessment & Plan  Sore throat   Acute condition, new, Supportive care with appropriate antipyretics and fluids.    Orders:    POCT rapid strep A    Congestion of nasal sinus   Acute condition, new, Supportive care with appropriate antipyretics and fluids.    Orders:    AMB POC SARS-COV-2 AND INFLUENZA A/B    Acute bacterial sinusitis   Acute condition, new, Supportive care with appropriate antipyretics and fluids.    Orders:    cefdinir (OMNICEF) 300 MG capsule; Take 1 capsule by mouth 2 times daily for 10 days    methylPREDNISolone (MEDROL DOSEPACK) 4 MG tablet; Take by mouth.    Acute cough   Acute condition, new, Supportive care with appropriate antipyretics and fluids.    Orders:    brompheniramine-pseudoephedrine-DM 2-30-10 MG/5ML syrup; Take 5 mLs by mouth 4 times daily as needed for Cough      No follow-ups on file.       Phani Bourgeois tells me her symptoms started on Friday.  She denies any fever or a productive cough.  She tells me that she did take some of her husbands cefdinir and did start to feel a little better.    Cough  This is a new problem. The current episode started in the past 7 days. The problem has been unchanged. The cough is Non-productive. Associated symptoms include ear pain, headaches, myalgias and a sore throat. Pertinent negatives include no fever. Nothing aggravates the symptoms. She has tried nothing for the symptoms.   Headache  Headache pattern:  Headache sometimes there, sometimes not at all  Pharyngitis  This is a new problem. The current episode started in the past 7 days. The problem has been gradually worsening. Associated symptoms include congestion, coughing, fatigue, headaches, myalgias and a sore throat. Pertinent negatives include no fever. The

## 2025-03-24 DIAGNOSIS — N39.3 STRESS INCONTINENCE: Primary | ICD-10-CM

## 2025-03-24 RX ORDER — OXYBUTYNIN CHLORIDE 5 MG/1
5 TABLET, EXTENDED RELEASE ORAL DAILY
Qty: 30 TABLET | Refills: 3 | Status: SHIPPED | OUTPATIENT
Start: 2025-03-24

## 2025-04-15 ENCOUNTER — OFFICE VISIT (OUTPATIENT)
Age: 65
End: 2025-04-15
Payer: MEDICARE

## 2025-04-15 VITALS
OXYGEN SATURATION: 98 % | HEIGHT: 65 IN | HEART RATE: 75 BPM | SYSTOLIC BLOOD PRESSURE: 132 MMHG | TEMPERATURE: 97.5 F | BODY MASS INDEX: 31.19 KG/M2 | WEIGHT: 187.2 LBS | RESPIRATION RATE: 18 BRPM | DIASTOLIC BLOOD PRESSURE: 70 MMHG

## 2025-04-15 DIAGNOSIS — F41.9 ANXIETY: Primary | ICD-10-CM

## 2025-04-15 DIAGNOSIS — F32.A DEPRESSION, UNSPECIFIED DEPRESSION TYPE: ICD-10-CM

## 2025-04-15 DIAGNOSIS — N39.3 STRESS INCONTINENCE: ICD-10-CM

## 2025-04-15 PROCEDURE — 3078F DIAST BP <80 MM HG: CPT

## 2025-04-15 PROCEDURE — 3075F SYST BP GE 130 - 139MM HG: CPT

## 2025-04-15 PROCEDURE — 1090F PRES/ABSN URINE INCON ASSESS: CPT

## 2025-04-15 PROCEDURE — G8417 CALC BMI ABV UP PARAM F/U: HCPCS

## 2025-04-15 PROCEDURE — G8427 DOCREV CUR MEDS BY ELIG CLIN: HCPCS

## 2025-04-15 PROCEDURE — G8400 PT W/DXA NO RESULTS DOC: HCPCS

## 2025-04-15 PROCEDURE — 1036F TOBACCO NON-USER: CPT

## 2025-04-15 PROCEDURE — 99214 OFFICE O/P EST MOD 30 MIN: CPT

## 2025-04-15 PROCEDURE — 1123F ACP DISCUSS/DSCN MKR DOCD: CPT

## 2025-04-15 PROCEDURE — 3017F COLORECTAL CA SCREEN DOC REV: CPT

## 2025-04-15 RX ORDER — ALPRAZOLAM 0.25 MG
0.25 TABLET ORAL 3 TIMES DAILY PRN
Qty: 90 TABLET | Refills: 0 | Status: SHIPPED | OUTPATIENT
Start: 2025-04-15 | End: 2025-05-15

## 2025-04-15 RX ORDER — DULOXETIN HYDROCHLORIDE 60 MG/1
60 CAPSULE, DELAYED RELEASE ORAL DAILY
Qty: 90 CAPSULE | Refills: 1 | Status: SHIPPED | OUTPATIENT
Start: 2025-04-15

## 2025-04-15 RX ORDER — CALCIUM CARBONATE 500(1250)
500 TABLET ORAL DAILY
COMMUNITY

## 2025-04-15 NOTE — PROGRESS NOTES
Chief Complaint   Patient presents with    Urinary Frequency    Depression       Have you seen any other physician or provider since your last visit no    Have you had any other diagnostic tests since your last visit? no    Have you changed or stopped any medications since your last visit? no

## 2025-04-16 NOTE — PROGRESS NOTES
[facility] [address]  Dept: [phone]  Loc: [phone]     SUBJECTIVE:  Vikki Sanchez is a 64 y.o. female that presents with   Chief Complaint   Patient presents with    Urinary Frequency    Depression   .    HPI:    Ana is a pleasant 64-year-old female who presents to clinic with concerns of incontinence, depression and anxiety.  Christelle has dealt with incontinence for quite some time, and even has a bladder stimulator.  She trialed 10 mg of oxybutynin, but the side effects were too much and we decreased her dose to 5 mg.  She tells me side effects have improved but she continues to have urine leakage.  We discussed her options and she would like to try pelvic floor strengthening.  She is also having some significant depression and anxiety.  Her father is in poor health and steadily declining.  She takes care of of her mother and her father.  She tells me she has been unable to sleep and when she does sleep it is very restless.  She also finds herself staring off or not processing information of activities going on right in front of her.  She denies any thoughts of suicide or self-harm.    Urinary Frequency   This is a chronic problem. The current episode started more than 1 year ago. The problem has been waxing and waning. The patient is experiencing no pain. There has been no fever. She is Sexually active. Associated symptoms include frequency and urgency. Pertinent negatives include no hematuria.   Depression  Visit Type: follow-up  Patient presents with the following symptoms: anhedonia, decreased concentration, depressed mood, irritability, malaise, memory impairment, nervousness/anxiety and psychomotor retardation.  Patient is not experiencing: suicidal ideas and suicidal planning.  Frequency of symptoms: most days   Severity: interfering with daily activities   Sleep quality: non-restorative  Nighttime awakenings:

## 2025-04-30 DIAGNOSIS — R32 URINARY INCONTINENCE, UNSPECIFIED TYPE: Primary | ICD-10-CM

## 2025-04-30 DIAGNOSIS — N39.3 STRESS INCONTINENCE: ICD-10-CM

## 2025-05-14 ENCOUNTER — OFFICE VISIT (OUTPATIENT)
Age: 65
End: 2025-05-14
Payer: MEDICARE

## 2025-05-14 VITALS
HEART RATE: 82 BPM | HEIGHT: 65 IN | SYSTOLIC BLOOD PRESSURE: 120 MMHG | TEMPERATURE: 97.6 F | RESPIRATION RATE: 16 BRPM | WEIGHT: 184.6 LBS | OXYGEN SATURATION: 97 % | BODY MASS INDEX: 30.75 KG/M2 | DIASTOLIC BLOOD PRESSURE: 74 MMHG

## 2025-05-14 DIAGNOSIS — I10 ESSENTIAL HYPERTENSION: ICD-10-CM

## 2025-05-14 DIAGNOSIS — F32.A DEPRESSION, UNSPECIFIED DEPRESSION TYPE: ICD-10-CM

## 2025-05-14 DIAGNOSIS — F41.9 ANXIETY: Primary | ICD-10-CM

## 2025-05-14 DIAGNOSIS — E03.9 ACQUIRED HYPOTHYROIDISM: ICD-10-CM

## 2025-05-14 PROCEDURE — G8417 CALC BMI ABV UP PARAM F/U: HCPCS | Performed by: NURSE PRACTITIONER

## 2025-05-14 PROCEDURE — 3017F COLORECTAL CA SCREEN DOC REV: CPT | Performed by: NURSE PRACTITIONER

## 2025-05-14 PROCEDURE — 1036F TOBACCO NON-USER: CPT | Performed by: NURSE PRACTITIONER

## 2025-05-14 PROCEDURE — 3078F DIAST BP <80 MM HG: CPT | Performed by: NURSE PRACTITIONER

## 2025-05-14 PROCEDURE — 99213 OFFICE O/P EST LOW 20 MIN: CPT | Performed by: NURSE PRACTITIONER

## 2025-05-14 PROCEDURE — 3074F SYST BP LT 130 MM HG: CPT | Performed by: NURSE PRACTITIONER

## 2025-05-14 PROCEDURE — 1123F ACP DISCUSS/DSCN MKR DOCD: CPT | Performed by: NURSE PRACTITIONER

## 2025-05-14 PROCEDURE — G8427 DOCREV CUR MEDS BY ELIG CLIN: HCPCS | Performed by: NURSE PRACTITIONER

## 2025-05-14 PROCEDURE — G8400 PT W/DXA NO RESULTS DOC: HCPCS | Performed by: NURSE PRACTITIONER

## 2025-05-14 PROCEDURE — 1090F PRES/ABSN URINE INCON ASSESS: CPT | Performed by: NURSE PRACTITIONER

## 2025-05-14 RX ORDER — BUSPIRONE HYDROCHLORIDE 5 MG/1
5 TABLET ORAL 2 TIMES DAILY
Qty: 60 TABLET | Refills: 2 | Status: SHIPPED | OUTPATIENT
Start: 2025-05-14 | End: 2025-08-12

## 2025-05-14 ASSESSMENT — ENCOUNTER SYMPTOMS
GASTROINTESTINAL NEGATIVE: 1
RESPIRATORY NEGATIVE: 1
EYES NEGATIVE: 1

## 2025-05-14 NOTE — PROGRESS NOTES
SUBJECTIVE:  Vikki Sanchez is a 64 y.o. female that presents with   Chief Complaint   Patient presents with    Incontinence     Increased stress incontinence. 5mg oxybutinin dries her out too much    Anxiety     Increased anxiety and stress, xanax \"males me too tired\"    .    HPI:    History of Present Illness  The patient presents for evaluation of anxiety and depression.    She reports experiencing cognitive difficulties, including impaired thinking and concentration, as well as persistent insomnia. She attributes these symptoms to heightened stress levels due to her father's deteriorating health. Her father has been diagnosed with a bladder tumor, cyst, or blood clot, necessitating further investigation by Dr. Blanton. She expresses concern about her father's ability to care for himself and her mother, given his declining cognitive function and forgetfulness, particularly in relation to medication management. She also mentions an incident involving her brother's aggressive behavior towards her parents. Despite attempts to manage her symptoms with Cymbalta, she continues to struggle with sleep disturbances. She has been prescribed Xanax, which she takes at a reduced dose of half a tablet, but reports feeling excessively sedated upon waking. She is seeking alternative treatment options that do not induce such severe sedation. She describes her current emotional state as one of severe depression. She has previously taken BuSpar and is open to resuming this medication.    FAMILY HISTORY  Her father has a possible diagnosis of bladder cancer.      Hypothyroidism    HPI:  Currently treated for Hypothyroidism?  Yes  Fatigue?  Yes  Recent change in weight?  yes  Cold/Heat intolerance?  No  Diarrhea/Constipation?  Yes constipation it is a lot better since colonoscopy  Diaphoresis?  No  Anxiety?  Yes  Palpitations?  No   Hair Loss?  No        Hypertension  This is a chronic problem. The current episode started more than

## 2025-05-14 NOTE — PROGRESS NOTES
Have you been to the ER, urgent care clinic since your last visit?  Hospitalized since your last visit?   NO    Have you seen or consulted any other health care providers outside our system since your last visit?   Yes, urology and gi for colonoscopy and egd           Pre-Visit chart review completed. All lab and imaging orders reviewed for outstanding orders and none found. Referrals reviewed and none outstanding. All Health Maintenance requirements reviewed and noted for any that are due at upcoming visit. Any hospital admission documentation, ER documentation or consult notes scanned to chart since last visit have been reviewed and noted for provider to review during upcoming visit.

## 2025-07-10 ENCOUNTER — OFFICE VISIT (OUTPATIENT)
Age: 65
End: 2025-07-10
Payer: MEDICARE

## 2025-07-10 VITALS
DIASTOLIC BLOOD PRESSURE: 72 MMHG | HEART RATE: 108 BPM | WEIGHT: 184 LBS | OXYGEN SATURATION: 98 % | TEMPERATURE: 97.3 F | RESPIRATION RATE: 18 BRPM | SYSTOLIC BLOOD PRESSURE: 134 MMHG | HEIGHT: 65 IN | BODY MASS INDEX: 30.66 KG/M2

## 2025-07-10 DIAGNOSIS — L23.7 POISON IVY DERMATITIS: Primary | ICD-10-CM

## 2025-07-10 PROCEDURE — 1123F ACP DISCUSS/DSCN MKR DOCD: CPT

## 2025-07-10 PROCEDURE — 96372 THER/PROPH/DIAG INJ SC/IM: CPT

## 2025-07-10 PROCEDURE — 3075F SYST BP GE 130 - 139MM HG: CPT

## 2025-07-10 PROCEDURE — G8417 CALC BMI ABV UP PARAM F/U: HCPCS

## 2025-07-10 PROCEDURE — 1090F PRES/ABSN URINE INCON ASSESS: CPT

## 2025-07-10 PROCEDURE — 99213 OFFICE O/P EST LOW 20 MIN: CPT

## 2025-07-10 PROCEDURE — 1036F TOBACCO NON-USER: CPT

## 2025-07-10 PROCEDURE — G8400 PT W/DXA NO RESULTS DOC: HCPCS

## 2025-07-10 PROCEDURE — 3017F COLORECTAL CA SCREEN DOC REV: CPT

## 2025-07-10 PROCEDURE — 3078F DIAST BP <80 MM HG: CPT

## 2025-07-10 PROCEDURE — G8427 DOCREV CUR MEDS BY ELIG CLIN: HCPCS

## 2025-07-10 RX ORDER — METHYLPREDNISOLONE SODIUM SUCCINATE 125 MG/2ML
125 INJECTION INTRAMUSCULAR; INTRAVENOUS ONCE
Status: COMPLETED | OUTPATIENT
Start: 2025-07-10 | End: 2025-07-10

## 2025-07-10 RX ADMIN — METHYLPREDNISOLONE SODIUM SUCCINATE 125 MG: 125 INJECTION INTRAMUSCULAR; INTRAVENOUS at 17:27

## 2025-07-10 NOTE — PROGRESS NOTES
Chief Complaint   Patient presents with    Poison Ivy     X2 days on toes, under breast, buttocks. States she started taking prednisone        Have you seen any other physician or provider since your last visit yes - GI     Have you had any other diagnostic tests since your last visit? no    Have you changed or stopped any medications since your last visit? yes - increased protonix to 40 mg bid     Patient tolerated injection well. Patient advised to wait 20 minutes in the office following the injection. No signs/symptoms of reaction noted after 20 minutes.  Administrations This Visit       methylPREDNISolone sodium succ (SOLU-MEDROL) injection 125 mg       Admin Date  07/10/2025  17:27 Action  Given Dose  125 mg Route  IntraMUSCular Site  Dorsogluteal Left Documented By  Candice Wong RN    NDC: 20973-723-15    : YASSSU    Patient Supplied?: No

## 2025-07-11 NOTE — PROGRESS NOTES
Vikki Sanchez 64 y.o. presents today for   Chief Complaint   Patient presents with    Poison Ivy     X2 days on toes, under breast, buttocks. States she started taking prednisone         HPI:  Vikki Sanchez states 2 days ago she had been out working in her yard and came into contact with either poison ivy or poison oak.  She does have a significant poison ivy rash on her feet, underneath her breasts and on her butt.  She does admit to starting a leftover Medrol Dosepak, but tells me she has not seen any improvement in the rash.    Family History   Problem Relation Age of Onset    Arthritis Father     Depression Father     Substance Abuse Brother     Vision Loss Brother     Vision Loss Paternal Aunt     Diabetes Paternal Uncle     Early Death Paternal Uncle     Heart Disease Maternal Grandmother     Stroke Maternal Grandmother     High Blood Pressure Paternal Grandmother     High Cholesterol Paternal Grandmother     Heart Disease Paternal Grandfather         Social History     Socioeconomic History    Marital status:      Spouse name: Not on file    Number of children: Not on file    Years of education: Not on file    Highest education level: Not on file   Occupational History    Not on file   Tobacco Use    Smoking status: Never    Smokeless tobacco: Never   Vaping Use    Vaping status: Never Used   Substance and Sexual Activity    Alcohol use: No    Drug use: No    Sexual activity: Not on file   Other Topics Concern    Not on file   Social History Narrative    Not on file     Social Drivers of Health     Financial Resource Strain: Low Risk  (1/10/2024)    Overall Financial Resource Strain (CARDIA)     Difficulty of Paying Living Expenses: Not hard at all   Food Insecurity: No Food Insecurity (3/5/2025)    Hunger Vital Sign     Worried About Running Out of Food in the Last Year: Never true     Ran Out of Food in the Last Year: Never true   Transportation Needs: No Transportation Needs

## 2025-08-11 RX ORDER — PANTOPRAZOLE SODIUM 40 MG/1
40 TABLET, DELAYED RELEASE ORAL 2 TIMES DAILY
Qty: 180 TABLET | Refills: 3 | Status: SHIPPED | OUTPATIENT
Start: 2025-08-11

## 2025-09-04 ENCOUNTER — OFFICE VISIT (OUTPATIENT)
Age: 65
End: 2025-09-04

## 2025-09-04 ENCOUNTER — HOSPITAL ENCOUNTER (OUTPATIENT)
Facility: HOSPITAL | Age: 65
Discharge: HOME | End: 2025-09-04
Payer: MEDICARE

## 2025-09-04 VITALS
RESPIRATION RATE: 16 BRPM | BODY MASS INDEX: 29.99 KG/M2 | DIASTOLIC BLOOD PRESSURE: 70 MMHG | HEART RATE: 64 BPM | WEIGHT: 180 LBS | TEMPERATURE: 97.9 F | HEIGHT: 65 IN | SYSTOLIC BLOOD PRESSURE: 110 MMHG | OXYGEN SATURATION: 98 %

## 2025-09-04 DIAGNOSIS — I10 ESSENTIAL HYPERTENSION: ICD-10-CM

## 2025-09-04 DIAGNOSIS — F41.9 ANXIETY: ICD-10-CM

## 2025-09-04 DIAGNOSIS — F32.A DEPRESSION, UNSPECIFIED DEPRESSION TYPE: ICD-10-CM

## 2025-09-04 DIAGNOSIS — N39.3 STRESS INCONTINENCE: ICD-10-CM

## 2025-09-04 DIAGNOSIS — Z00.00 MEDICARE ANNUAL WELLNESS VISIT, SUBSEQUENT: ICD-10-CM

## 2025-09-04 DIAGNOSIS — R73.03 PREDIABETES: ICD-10-CM

## 2025-09-04 DIAGNOSIS — E03.9 ACQUIRED HYPOTHYROIDISM: Primary | ICD-10-CM

## 2025-09-04 DIAGNOSIS — K58.1 IRRITABLE BOWEL SYNDROME WITH CONSTIPATION: ICD-10-CM

## 2025-09-04 DIAGNOSIS — K21.9 GASTROESOPHAGEAL REFLUX DISEASE WITHOUT ESOPHAGITIS: ICD-10-CM

## 2025-09-04 DIAGNOSIS — E03.9 ACQUIRED HYPOTHYROIDISM: ICD-10-CM

## 2025-09-04 LAB
ALBUMIN SERPL-MCNC: 4.2 G/DL (ref 3.4–4.8)
ALBUMIN/GLOB SERPL: 1.7 {RATIO} (ref 0.8–2)
ALP SERPL-CCNC: 88 U/L (ref 25–100)
ALT SERPL-CCNC: 21 U/L (ref 4–36)
ANION GAP SERPL CALCULATED.3IONS-SCNC: 11 MMOL/L (ref 3–16)
AST SERPL-CCNC: 22 U/L (ref 8–33)
BILIRUB SERPL-MCNC: 0.5 MG/DL (ref 0.3–1.2)
BUN SERPL-MCNC: 17 MG/DL (ref 6–20)
CALCIUM SERPL-MCNC: 10.8 MG/DL (ref 8.3–10.6)
CHLORIDE SERPL-SCNC: 103 MMOL/L (ref 98–107)
CHOLEST SERPL-MCNC: 116 MG/DL (ref 0–200)
CO2 SERPL-SCNC: 25 MMOL/L (ref 20–30)
CREAT SERPL-MCNC: 1 MG/DL (ref 0.6–1.2)
GFR SERPLBLD CREATININE-BSD FMLA CKD-EPI: 62 ML/MIN/{1.73_M2}
GLOBULIN SER CALC-MCNC: 2.5 G/DL
GLUCOSE SERPL-MCNC: 82 MG/DL (ref 74–106)
HBA1C MFR BLD: 5.6 %
HDLC SERPL-MCNC: 43 MG/DL (ref 40–60)
LDLC SERPL CALC-MCNC: 53 MG/DL
POTASSIUM SERPL-SCNC: 4.4 MMOL/L (ref 3.4–5.1)
PROT SERPL-MCNC: 6.7 G/DL (ref 6.4–8.3)
SODIUM SERPL-SCNC: 139 MMOL/L (ref 136–145)
TRIGL SERPL-MCNC: 99 MG/DL (ref 0–249)
TSH SERPL DL<=0.005 MIU/L-ACNC: 1.56 UIU/ML (ref 0.27–4.2)
VLDLC SERPL CALC-MCNC: 20 MG/DL

## 2025-09-04 PROCEDURE — 83036 HEMOGLOBIN GLYCOSYLATED A1C: CPT

## 2025-09-04 PROCEDURE — 80061 LIPID PANEL: CPT

## 2025-09-04 PROCEDURE — 80053 COMPREHEN METABOLIC PANEL: CPT

## 2025-09-04 PROCEDURE — 84443 ASSAY THYROID STIM HORMONE: CPT

## 2025-09-04 PROCEDURE — 36415 COLL VENOUS BLD VENIPUNCTURE: CPT

## 2025-09-04 RX ORDER — BUSPIRONE HYDROCHLORIDE 5 MG/1
5 TABLET ORAL 2 TIMES DAILY
Qty: 180 TABLET | Refills: 1 | Status: SHIPPED | OUTPATIENT
Start: 2025-09-04 | End: 2026-03-03

## 2025-09-04 RX ORDER — DULOXETIN HYDROCHLORIDE 60 MG/1
60 CAPSULE, DELAYED RELEASE ORAL DAILY
Qty: 90 CAPSULE | Refills: 1 | Status: SHIPPED | OUTPATIENT
Start: 2025-09-04

## 2025-09-04 RX ORDER — PANTOPRAZOLE SODIUM 40 MG/1
40 TABLET, DELAYED RELEASE ORAL
Qty: 90 TABLET | Refills: 1 | OUTPATIENT
Start: 2025-09-04

## 2025-09-04 ASSESSMENT — PATIENT HEALTH QUESTIONNAIRE - PHQ9
3. TROUBLE FALLING OR STAYING ASLEEP: NOT AT ALL
SUM OF ALL RESPONSES TO PHQ QUESTIONS 1-9: 2
5. POOR APPETITE OR OVEREATING: NOT AT ALL
8. MOVING OR SPEAKING SO SLOWLY THAT OTHER PEOPLE COULD HAVE NOTICED. OR THE OPPOSITE, BEING SO FIGETY OR RESTLESS THAT YOU HAVE BEEN MOVING AROUND A LOT MORE THAN USUAL: NOT AT ALL
SUM OF ALL RESPONSES TO PHQ QUESTIONS 1-9: 2
7. TROUBLE CONCENTRATING ON THINGS, SUCH AS READING THE NEWSPAPER OR WATCHING TELEVISION: NOT AT ALL
SUM OF ALL RESPONSES TO PHQ QUESTIONS 1-9: 2
4. FEELING TIRED OR HAVING LITTLE ENERGY: SEVERAL DAYS
2. FEELING DOWN, DEPRESSED OR HOPELESS: SEVERAL DAYS
9. THOUGHTS THAT YOU WOULD BE BETTER OFF DEAD, OR OF HURTING YOURSELF: NOT AT ALL
1. LITTLE INTEREST OR PLEASURE IN DOING THINGS: NOT AT ALL
10. IF YOU CHECKED OFF ANY PROBLEMS, HOW DIFFICULT HAVE THESE PROBLEMS MADE IT FOR YOU TO DO YOUR WORK, TAKE CARE OF THINGS AT HOME, OR GET ALONG WITH OTHER PEOPLE: NOT DIFFICULT AT ALL
6. FEELING BAD ABOUT YOURSELF - OR THAT YOU ARE A FAILURE OR HAVE LET YOURSELF OR YOUR FAMILY DOWN: NOT AT ALL
SUM OF ALL RESPONSES TO PHQ QUESTIONS 1-9: 2

## 2025-09-04 ASSESSMENT — LIFESTYLE VARIABLES
HOW MANY STANDARD DRINKS CONTAINING ALCOHOL DO YOU HAVE ON A TYPICAL DAY: PATIENT DOES NOT DRINK
HOW OFTEN DO YOU HAVE A DRINK CONTAINING ALCOHOL: NEVER